# Patient Record
Sex: FEMALE | Race: WHITE | NOT HISPANIC OR LATINO | Employment: FULL TIME | URBAN - METROPOLITAN AREA
[De-identification: names, ages, dates, MRNs, and addresses within clinical notes are randomized per-mention and may not be internally consistent; named-entity substitution may affect disease eponyms.]

---

## 2017-02-24 ENCOUNTER — TRANSCRIBE ORDERS (OUTPATIENT)
Dept: LAB | Facility: CLINIC | Age: 42
End: 2017-02-24

## 2017-02-24 ENCOUNTER — APPOINTMENT (OUTPATIENT)
Dept: LAB | Facility: CLINIC | Age: 42
End: 2017-02-24
Payer: COMMERCIAL

## 2017-02-24 DIAGNOSIS — I10 ESSENTIAL (PRIMARY) HYPERTENSION: ICD-10-CM

## 2017-02-24 DIAGNOSIS — R79.89 OTHER SPECIFIED ABNORMAL FINDINGS OF BLOOD CHEMISTRY: ICD-10-CM

## 2017-02-24 LAB — TSH SERPL DL<=0.05 MIU/L-ACNC: 3.29 UIU/ML (ref 0.36–3.74)

## 2017-02-24 PROCEDURE — 86800 THYROGLOBULIN ANTIBODY: CPT

## 2017-02-24 PROCEDURE — 84443 ASSAY THYROID STIM HORMONE: CPT

## 2017-02-24 PROCEDURE — 36415 COLL VENOUS BLD VENIPUNCTURE: CPT

## 2017-02-24 PROCEDURE — 86376 MICROSOMAL ANTIBODY EACH: CPT

## 2017-02-25 LAB
THYROGLOB AB SERPL-ACNC: <1 IU/ML (ref 0–0.9)
THYROPEROXIDASE AB SERPL-ACNC: 9 IU/ML (ref 0–34)

## 2017-02-27 ENCOUNTER — GENERIC CONVERSION - ENCOUNTER (OUTPATIENT)
Dept: OTHER | Facility: OTHER | Age: 42
End: 2017-02-27

## 2017-04-11 ENCOUNTER — GENERIC CONVERSION - ENCOUNTER (OUTPATIENT)
Dept: OTHER | Facility: OTHER | Age: 42
End: 2017-04-11

## 2017-04-17 ENCOUNTER — APPOINTMENT (OUTPATIENT)
Dept: OCCUPATIONAL THERAPY | Facility: CLINIC | Age: 42
End: 2017-04-17
Payer: COMMERCIAL

## 2017-04-17 PROCEDURE — 97166 OT EVAL MOD COMPLEX 45 MIN: CPT

## 2017-04-17 PROCEDURE — 97535 SELF CARE MNGMENT TRAINING: CPT

## 2017-04-21 ENCOUNTER — APPOINTMENT (OUTPATIENT)
Dept: OCCUPATIONAL THERAPY | Facility: CLINIC | Age: 42
End: 2017-04-21
Payer: COMMERCIAL

## 2017-04-21 PROCEDURE — 97035 APP MDLTY 1+ULTRASOUND EA 15: CPT

## 2017-04-21 PROCEDURE — 97140 MANUAL THERAPY 1/> REGIONS: CPT

## 2017-04-21 PROCEDURE — 97110 THERAPEUTIC EXERCISES: CPT

## 2017-04-28 ENCOUNTER — APPOINTMENT (OUTPATIENT)
Dept: OCCUPATIONAL THERAPY | Facility: CLINIC | Age: 42
End: 2017-04-28
Payer: COMMERCIAL

## 2017-04-28 PROCEDURE — 97140 MANUAL THERAPY 1/> REGIONS: CPT

## 2017-04-28 PROCEDURE — 97035 APP MDLTY 1+ULTRASOUND EA 15: CPT

## 2017-04-28 PROCEDURE — 97110 THERAPEUTIC EXERCISES: CPT

## 2017-05-05 ENCOUNTER — GENERIC CONVERSION - ENCOUNTER (OUTPATIENT)
Dept: OTHER | Facility: OTHER | Age: 42
End: 2017-05-05

## 2017-05-05 ENCOUNTER — APPOINTMENT (OUTPATIENT)
Dept: OCCUPATIONAL THERAPY | Facility: CLINIC | Age: 42
End: 2017-05-05
Payer: COMMERCIAL

## 2017-05-05 PROCEDURE — 97140 MANUAL THERAPY 1/> REGIONS: CPT

## 2017-05-05 PROCEDURE — 97110 THERAPEUTIC EXERCISES: CPT

## 2017-05-05 PROCEDURE — 97035 APP MDLTY 1+ULTRASOUND EA 15: CPT

## 2017-05-10 ENCOUNTER — APPOINTMENT (OUTPATIENT)
Dept: OCCUPATIONAL THERAPY | Facility: CLINIC | Age: 42
End: 2017-05-10
Payer: COMMERCIAL

## 2017-05-19 ENCOUNTER — ALLSCRIPTS OFFICE VISIT (OUTPATIENT)
Dept: OTHER | Facility: OTHER | Age: 42
End: 2017-05-19

## 2017-05-19 DIAGNOSIS — J30.9 ALLERGIC RHINITIS: ICD-10-CM

## 2017-05-19 DIAGNOSIS — I10 ESSENTIAL (PRIMARY) HYPERTENSION: ICD-10-CM

## 2017-05-28 ENCOUNTER — HOSPITAL ENCOUNTER (OUTPATIENT)
Dept: RADIOLOGY | Facility: CLINIC | Age: 42
Discharge: HOME | End: 2017-05-28
Attending: EMERGENCY MEDICINE
Payer: OTHER MISCELLANEOUS

## 2017-05-28 ENCOUNTER — OFFICE VISIT (OUTPATIENT)
Dept: URGENT CARE | Facility: CLINIC | Age: 42
End: 2017-05-28
Payer: OTHER MISCELLANEOUS

## 2017-05-28 DIAGNOSIS — T14.90XA INJURY: ICD-10-CM

## 2017-05-28 PROCEDURE — G0382 LEV 3 HOSP TYPE B ED VISIT: HCPCS

## 2017-05-28 PROCEDURE — 99283 EMERGENCY DEPT VISIT LOW MDM: CPT

## 2017-05-28 PROCEDURE — 72040 X-RAY EXAM NECK SPINE 2-3 VW: CPT

## 2017-05-31 ENCOUNTER — OFFICE VISIT (OUTPATIENT)
Dept: URGENT CARE | Facility: CLINIC | Age: 42
End: 2017-05-31
Payer: OTHER MISCELLANEOUS

## 2017-05-31 PROCEDURE — 99213 OFFICE O/P EST LOW 20 MIN: CPT

## 2017-06-05 ENCOUNTER — OFFICE VISIT (OUTPATIENT)
Dept: URGENT CARE | Facility: CLINIC | Age: 42
End: 2017-06-05
Payer: OTHER MISCELLANEOUS

## 2017-06-05 PROCEDURE — 99213 OFFICE O/P EST LOW 20 MIN: CPT

## 2017-06-06 ENCOUNTER — GENERIC CONVERSION - ENCOUNTER (OUTPATIENT)
Dept: OTHER | Facility: OTHER | Age: 42
End: 2017-06-06

## 2017-06-09 ENCOUNTER — OFFICE VISIT (OUTPATIENT)
Dept: URGENT CARE | Facility: CLINIC | Age: 42
End: 2017-06-09
Payer: OTHER MISCELLANEOUS

## 2017-06-09 PROCEDURE — 99213 OFFICE O/P EST LOW 20 MIN: CPT

## 2017-06-16 ENCOUNTER — OFFICE VISIT (OUTPATIENT)
Dept: URGENT CARE | Facility: CLINIC | Age: 42
End: 2017-06-16
Payer: OTHER MISCELLANEOUS

## 2017-06-16 PROCEDURE — 99213 OFFICE O/P EST LOW 20 MIN: CPT

## 2017-09-26 ENCOUNTER — GENERIC CONVERSION - ENCOUNTER (OUTPATIENT)
Dept: OTHER | Facility: OTHER | Age: 42
End: 2017-09-26

## 2017-10-31 ENCOUNTER — GENERIC CONVERSION - ENCOUNTER (OUTPATIENT)
Dept: OTHER | Facility: OTHER | Age: 42
End: 2017-10-31

## 2017-10-31 DIAGNOSIS — I10 ESSENTIAL (PRIMARY) HYPERTENSION: ICD-10-CM

## 2017-10-31 DIAGNOSIS — Z12.31 ENCOUNTER FOR SCREENING MAMMOGRAM FOR MALIGNANT NEOPLASM OF BREAST: ICD-10-CM

## 2017-10-31 DIAGNOSIS — R94.6 ABNORMAL RESULTS OF THYROID FUNCTION STUDIES: ICD-10-CM

## 2017-10-31 DIAGNOSIS — E78.00 PURE HYPERCHOLESTEROLEMIA: ICD-10-CM

## 2017-10-31 DIAGNOSIS — K21.9 GASTRO-ESOPHAGEAL REFLUX DISEASE WITHOUT ESOPHAGITIS: ICD-10-CM

## 2017-10-31 DIAGNOSIS — M79.7 FIBROMYALGIA: ICD-10-CM

## 2017-11-10 ENCOUNTER — GENERIC CONVERSION - ENCOUNTER (OUTPATIENT)
Dept: OTHER | Facility: OTHER | Age: 42
End: 2017-11-10

## 2017-11-14 ENCOUNTER — GENERIC CONVERSION - ENCOUNTER (OUTPATIENT)
Dept: OTHER | Facility: OTHER | Age: 42
End: 2017-11-14

## 2017-11-17 ENCOUNTER — ALLSCRIPTS OFFICE VISIT (OUTPATIENT)
Dept: OTHER | Facility: OTHER | Age: 42
End: 2017-11-17

## 2017-12-01 ENCOUNTER — TRANSCRIBE ORDERS (OUTPATIENT)
Dept: URGENT CARE | Facility: CLINIC | Age: 42
End: 2017-12-01

## 2017-12-01 ENCOUNTER — APPOINTMENT (OUTPATIENT)
Dept: LAB | Facility: CLINIC | Age: 42
End: 2017-12-01
Payer: COMMERCIAL

## 2017-12-01 DIAGNOSIS — R94.6 ABNORMAL RESULTS OF THYROID FUNCTION STUDIES: ICD-10-CM

## 2017-12-01 DIAGNOSIS — M79.7 FIBROMYALGIA: ICD-10-CM

## 2017-12-01 DIAGNOSIS — E78.00 PURE HYPERCHOLESTEROLEMIA: ICD-10-CM

## 2017-12-01 DIAGNOSIS — I10 ESSENTIAL (PRIMARY) HYPERTENSION: ICD-10-CM

## 2017-12-01 LAB
ALBUMIN SERPL BCP-MCNC: 3.9 G/DL (ref 3.5–5)
ALP SERPL-CCNC: 80 U/L (ref 46–116)
ALT SERPL W P-5'-P-CCNC: 34 U/L (ref 12–78)
ANION GAP SERPL CALCULATED.3IONS-SCNC: 6 MMOL/L (ref 4–13)
AST SERPL W P-5'-P-CCNC: 12 U/L (ref 5–45)
BASOPHILS # BLD AUTO: 0.02 THOUSANDS/ΜL (ref 0–0.1)
BASOPHILS NFR BLD AUTO: 0 % (ref 0–1)
BILIRUB SERPL-MCNC: 1.08 MG/DL (ref 0.2–1)
BUN SERPL-MCNC: 15 MG/DL (ref 5–25)
CALCIUM SERPL-MCNC: 8.9 MG/DL (ref 8.3–10.1)
CHLORIDE SERPL-SCNC: 102 MMOL/L (ref 100–108)
CHOLEST SERPL-MCNC: 205 MG/DL (ref 50–200)
CO2 SERPL-SCNC: 26 MMOL/L (ref 21–32)
CREAT SERPL-MCNC: 0.84 MG/DL (ref 0.6–1.3)
EOSINOPHIL # BLD AUTO: 0.11 THOUSAND/ΜL (ref 0–0.61)
EOSINOPHIL NFR BLD AUTO: 2 % (ref 0–6)
ERYTHROCYTE [DISTWIDTH] IN BLOOD BY AUTOMATED COUNT: 13.7 % (ref 11.6–15.1)
GFR SERPL CREATININE-BSD FRML MDRD: 85 ML/MIN/1.73SQ M
GLUCOSE P FAST SERPL-MCNC: 116 MG/DL (ref 65–99)
HCT VFR BLD AUTO: 41.5 % (ref 34.8–46.1)
HDLC SERPL-MCNC: 35 MG/DL (ref 40–60)
HGB BLD-MCNC: 13.4 G/DL (ref 11.5–15.4)
LDLC SERPL CALC-MCNC: 139 MG/DL (ref 0–100)
LYMPHOCYTES # BLD AUTO: 2.25 THOUSANDS/ΜL (ref 0.6–4.47)
LYMPHOCYTES NFR BLD AUTO: 30 % (ref 14–44)
MCH RBC QN AUTO: 29.1 PG (ref 26.8–34.3)
MCHC RBC AUTO-ENTMCNC: 32.3 G/DL (ref 31.4–37.4)
MCV RBC AUTO: 90 FL (ref 82–98)
MONOCYTES # BLD AUTO: 0.49 THOUSAND/ΜL (ref 0.17–1.22)
MONOCYTES NFR BLD AUTO: 7 % (ref 4–12)
NEUTROPHILS # BLD AUTO: 4.61 THOUSANDS/ΜL (ref 1.85–7.62)
NEUTS SEG NFR BLD AUTO: 61 % (ref 43–75)
NRBC BLD AUTO-RTO: 0 /100 WBCS
PLATELET # BLD AUTO: 268 THOUSANDS/UL (ref 149–390)
PMV BLD AUTO: 11.3 FL (ref 8.9–12.7)
POTASSIUM SERPL-SCNC: 4.1 MMOL/L (ref 3.5–5.3)
PROT SERPL-MCNC: 7.7 G/DL (ref 6.4–8.2)
RBC # BLD AUTO: 4.61 MILLION/UL (ref 3.81–5.12)
SODIUM SERPL-SCNC: 134 MMOL/L (ref 136–145)
TRIGL SERPL-MCNC: 153 MG/DL
TSH SERPL DL<=0.05 MIU/L-ACNC: 2.75 UIU/ML (ref 0.36–3.74)
WBC # BLD AUTO: 7.49 THOUSAND/UL (ref 4.31–10.16)

## 2017-12-01 PROCEDURE — 85025 COMPLETE CBC W/AUTO DIFF WBC: CPT

## 2017-12-01 PROCEDURE — 80053 COMPREHEN METABOLIC PANEL: CPT

## 2017-12-01 PROCEDURE — 80061 LIPID PANEL: CPT

## 2017-12-01 PROCEDURE — 36415 COLL VENOUS BLD VENIPUNCTURE: CPT

## 2017-12-01 PROCEDURE — 84443 ASSAY THYROID STIM HORMONE: CPT

## 2017-12-02 ENCOUNTER — GENERIC CONVERSION - ENCOUNTER (OUTPATIENT)
Dept: OTHER | Facility: OTHER | Age: 42
End: 2017-12-02

## 2018-01-10 NOTE — MISCELLANEOUS
Provider Comments  Provider Comments:   Patient no-showed for today's appointment  Juan Carlos SANTORO mailed no-show letter        Signatures   Electronically signed by : Deborah Zapata MD; May 15 2016 12:02PM EST                       (Author)

## 2018-01-12 VITALS
DIASTOLIC BLOOD PRESSURE: 88 MMHG | RESPIRATION RATE: 20 BRPM | SYSTOLIC BLOOD PRESSURE: 132 MMHG | BODY MASS INDEX: 38.67 KG/M2 | TEMPERATURE: 98.1 F | HEIGHT: 63 IN | HEART RATE: 92 BPM | WEIGHT: 218.25 LBS

## 2018-01-12 NOTE — RESULT NOTES
Verified Results  (1) TSH WITH FT4 REFLEX 24Feb2017 09:13AM Marlena Avelar Order Number: [id number]     Test Name Result Flag Reference   TSH 3 290 uIU/mL  0 358-3 740   Patients undergoing fluorescein dye angiography may retain small amounts of fluorescein in the body for 48-72 hours post procedure  Samples containing fluorescein can produce falsely depressed TSH values  If the patient had this procedure,a specimen should be resubmitted post fluorescein clearance            The recommended reference ranges for TSH during pregnancy are as follows:  First trimester 0 1 to 2 5 uIU/mL  Second trimester  0 2 to 3 0 uIU/mL  Third trimester 0 3 to 3 0 uIU/m     (1) THYROID ANTIBODIES PANEL 24Feb2017 09:13AM Marlena Avelar Order Number: [id number]     Test Name Result Flag Reference   THYROGLOB AB <1 0 IU/mL  0 0 - 0 9   Thyroglobulin Antibody measured by [facility] Methodology    Performed at:  [facility] [address]  [id number]  : Yoandy Macdonald MD, Phone:  [phone]   [facility] AB 9 IU/mL  0 - 34   Performed at:  [facility] [address]  [id number]  : Yoandy Macdonald MD, Phone:  [phone]

## 2018-01-14 VITALS
WEIGHT: 221.38 LBS | RESPIRATION RATE: 18 BRPM | SYSTOLIC BLOOD PRESSURE: 110 MMHG | DIASTOLIC BLOOD PRESSURE: 72 MMHG | HEIGHT: 63 IN | HEART RATE: 72 BPM | TEMPERATURE: 98.5 F | BODY MASS INDEX: 39.23 KG/M2

## 2018-01-14 NOTE — RESULT NOTES
Verified Results  (1) THIN PREP PAP WITH IMAGING [id number] 12:00AM Jacquelin Burgess     Test Name Result Flag Reference   LAB AP CASE REPORT (Report)     Gynecologic Cytology Report            Case: [id number]                  Authorizing Provider: Jm Mcdonald PA-C   Collected:      12/01/2016           First Screen:     MORRIS Askew    Received:      12/05/2016 1604        Specimen:  LIQUID-BASED PAP, SCREENING, Cervix   LAB AP GYN PRIMARY INTERPRETATION      Negative for intraepithelial lesion or malignancy  Electronically signed by MORRIS Askew on 12/6/2016 at 4:47 PM   LAB AP GYN SPECIMEN ADEQUACY      Satisfactory for evaluation  Absence of endocervical/transformation zone component  LAB AP GYN ADDITIONAL INFORMATION (Report)     [employer]s FDA approved ,  and ThinPrep Imaging System are   utilized with strict adherence to the 's instruction manual to   prepare gynecologic and non-gynecologic cytology specimens for the   production of ThinPrep slides as well as for gynecologic ThinPrep imaging  These processes have been validated by our laboratory and/or by the     The Pap test is not a diagnostic procedure and should not be used as the   sole means to detect cervical cancer  It is only a screening procedure to   aid in the detection of cervical cancer and its precursors  Both   false-negative and false-positive results have been experienced  Your   patient's test result should be interpreted in this context together with   the history and clinical findings

## 2018-01-17 NOTE — RESULT NOTES
Verified Results  (1) CBC/PLT/DIFF [id number] 08:39AM Cassy Encinas Order Number: [id number]     Test Name Result Flag Reference   WBC COUNT 6 57 Thousand/uL  4 31-10 16   RBC COUNT 4 49 Million/uL  3 81-5 12   HEMOGLOBIN 13 1 g/dL  11 5-15 4   HEMATOCRIT 40 8 %  34 8-46  1   MCV 91 fL  82-98   MCH 29 2 pg  26 8-34 3   MCHC 32 1 g/dL  31 4-37 4   RDW 13 4 %  11 6-15 1   MPV 11 2 fL  8 9-12 7   PLATELET COUNT 378 Thousands/uL  149-390   nRBC AUTOMATED 0 /100 WBCs     NEUTROPHILS RELATIVE PERCENT 59 %  43-75   LYMPHOCYTES RELATIVE PERCENT 33 %  14-44   MONOCYTES RELATIVE PERCENT 6 %  4-12   EOSINOPHILS RELATIVE PERCENT 2 %  0-6   BASOPHILS RELATIVE PERCENT 0 %  0-1   NEUTROPHILS ABSOLUTE COUNT 3 86 Thousands/?L  1 85-7 62   LYMPHOCYTES ABSOLUTE COUNT 2 15 Thousands/?L  0 60-4 47   MONOCYTES ABSOLUTE COUNT 0 42 Thousand/?L  0 17-1 22   EOSINOPHILS ABSOLUTE COUNT 0 10 Thousand/?L  0 00-0 61   BASOPHILS ABSOLUTE COUNT 0 02 Thousands/?L  0 00-0 10   - Patient Instructions: This bloodwork is non-fasting  Please drink two glasses of water morning of bloodwork  - Patient Instructions: This bloodwork is non-fasting  Please drink two glasses of water morning of bloodwork  (1) COMPREHENSIVE METABOLIC PANEL [id number] 52:27NC Cassy Encinas Order Number: [id number]     Test Name Result Flag Reference   GLUCOSE,RANDM 108 mg/dL     If the patient is fasting, the ADA then defines impaired fasting glucose as > 100 mg/dL and diabetes as > or equal to 123 mg/dL     SODIUM 138 mmol/L  136-145   POTASSIUM 3 9 mmol/L  3 5-5 3   CHLORIDE 104 mmol/L  100-108   CARBON DIOXIDE 27 mmol/L  21-32   ANION GAP (CALC) 7 mmol/L  4-13   BLOOD UREA NITROGEN 11 mg/dL  5-25   CREATININE 0 81 mg/dL  0 60-1 30   Standardized to IDMS reference method   CALCIUM 8 8 mg/dL  8 3-10 1   BILI, TOTAL 0 53 mg/dL  0 20-1 00   ALK PHOSPHATAS 102 U/L     ALT (SGPT) 35 U/L  12-78   AST(SGOT) 17 U/L  5-45   ALBUMIN 3 8 g/dL  3 5-5 0   TOTAL PROTEIN 7 4 g/dL  6 4-8 2   eGFR Non-African American      >60 0 ml/min/1 73sq m   - Patient Instructions: This is a fasting blood test  Water, black tea or black coffee only after 9:00pm the night before test Drink 2 glasses of water the morning of test   National Kidney Disease Education Program recommendations are as follows:  GFR calculation is accurate only with a steady state creatinine  Chronic Kidney disease less than 60 ml/min/1 73 sq  meters  Kidney failure less than 15 ml/min/1 73 sq  meters  (1) LIPID PANEL FASTING W DIRECT LDL REFLEX [id number] 08:39AM Sakshi Casanova Order Number: [id number]     Test Name Result Flag Reference   CHOLESTEROL 180 mg/dL     LDL CHOLESTEROL CALCULATED 110 mg/dL H 0-100   - Patient Instructions: This is a fasting blood test  Water, black tea or black coffee only after 9:00pm the night before test   Drink 2 glasses of water the morning of test     - Patient Instructions: This is a fasting blood test  Water, black tea or black coffee only after 9:00pm the night before test Drink 2 glasses of water the morning of test   Triglyceride:         Normal              <150 mg/dl       Borderline High    150-199 mg/dl       High               200-499 mg/dl       Very High          >499 mg/dl  Cholesterol:         Desirable        <200 mg/dl      Borderline High  200-239 mg/dl      High             >239 mg/dl  HDL Cholesterol:        High    >59 mg/dL      Low     <41 mg/dL  LDL Cholesterol:        Optimal          <100 mg/dl        Near Optimal     100-129 mg/dl        Above Optimal          Borderline High   130-159 mg/dl          High              160-189 mg/dl          Very High        >189 mg/dl  LDL CALCULATED:    This screening LDL is a calculated result  It does not have the accuracy of the Direct Measured LDL in the monitoring of patients with hyperlipidemia and/or statin therapy     Direct Measure LDL (CDK171) must be ordered separately in these patients  TRIGLYCERIDES 166 mg/dL H <=150   Specimen collection should occur prior to N-Acetylcysteine or Metamizole administration due to the potential for falsely depressed results  HDL,DIRECT 37 mg/dL L 40-60   Specimen collection should occur prior to Metamizole administration due to the potential for falsely depressed results  (1) VITAMIN D 25-HYDROXY [id number] 08:39AM Celine Tavares Order Number: [id number]     Test Name Result Flag Reference   VIT D 25-HYDROX 24 5 ng/mL L 30 0-100 0   This assay is a certified procedure of the CDC Vitamin D Standardization Certification Program (VDSCP)     Deficiency <20ng/ml   Insufficiency 20-30ng/ml   Sufficient  ng/ml     *Patients undergoing fluorescein dye angiography may retain small amounts of fluorescein in the body for 48-72 hours post procedure  Samples containing fluorescein can produce falsely elevated Vitamin D values  If the patient had this procedure, a specimen should be resubmitted post fluorescein clearance  (1) TSH WITH FT4 REFLEX [id number] 08:39AM Celine Tavares Order Number: [id number]     Test Name Result Flag Reference   TSH 4 750 uIU/mL H 0 358-3 740   - Patient Instructions: This is a fasting blood test  Water, black tea or black coffee only after 9:00pm the night before test Drink 2 glasses of water the morning of test   Patients undergoing fluorescein dye angiography may retain small amounts of fluorescein in the body for 48-72 hours post procedure  Samples containing fluorescein can produce falsely depressed TSH values  If the patient had this procedure,a specimen should be resubmitted post fluorescein clearance  The recommended reference ranges for TSH during pregnancy are as follows:  First trimester 0 1 to 2 5 uIU/mL  Second trimester  0 2 to 3 0 uIU/mL  Third trimester 0 3 to 3 0 uIU/m   T4,FREE 1 16 ng/dL  0 76-1 46   - Patient Instructions:  This is a fasting blood test  Water, black tea or black coffee only after 9:00pm the night before test Drink 2 glasses of water the morning of test

## 2018-01-22 VITALS
TEMPERATURE: 98.9 F | HEIGHT: 63 IN | RESPIRATION RATE: 16 BRPM | SYSTOLIC BLOOD PRESSURE: 110 MMHG | WEIGHT: 223.38 LBS | BODY MASS INDEX: 39.58 KG/M2 | HEART RATE: 76 BPM | DIASTOLIC BLOOD PRESSURE: 76 MMHG

## 2018-01-22 VITALS
BODY MASS INDEX: 39.34 KG/M2 | HEIGHT: 63 IN | SYSTOLIC BLOOD PRESSURE: 122 MMHG | DIASTOLIC BLOOD PRESSURE: 88 MMHG | HEART RATE: 119 BPM | TEMPERATURE: 97.9 F | OXYGEN SATURATION: 97 % | WEIGHT: 222 LBS | RESPIRATION RATE: 18 BRPM

## 2018-01-22 VITALS
SYSTOLIC BLOOD PRESSURE: 122 MMHG | HEIGHT: 63 IN | HEART RATE: 76 BPM | WEIGHT: 222 LBS | DIASTOLIC BLOOD PRESSURE: 94 MMHG | BODY MASS INDEX: 39.34 KG/M2 | TEMPERATURE: 97.3 F | RESPIRATION RATE: 20 BRPM

## 2018-01-23 NOTE — RESULT NOTES
Verified Results  (1) COMPREHENSIVE METABOLIC PANEL [id number] 80:99CB Iwona Ba Order Number: [id number]     Test Name Result Flag Reference   SODIUM 134 mmol/L L 136-145   POTASSIUM 4 1 mmol/L  3 5-5 3   CHLORIDE 102 mmol/L  100-108   CARBON DIOXIDE 26 mmol/L  21-32   ANION GAP (CALC) 6 mmol/L  4-13   BLOOD UREA NITROGEN 15 mg/dL  5-25   CREATININE 0 84 mg/dL  0 60-1 30   Standardized to IDMS reference method   CALCIUM 8 9 mg/dL  8 3-10 1   BILI, TOTAL 1 08 mg/dL H 0 20-1 00   ALK PHOSPHATAS 80 U/L     ALT (SGPT) 34 U/L  12-78   Specimen collection should occur prior to Sulfasalazine and/or Sulfapyridine administration due to the potential for falsely depressed results  AST(SGOT) 12 U/L  5-45   Specimen collection should occur prior to Sulfasalazine administration due to the potential for falsely depressed results  ALBUMIN 3 9 g/dL  3 5-5 0   TOTAL PROTEIN 7 7 g/dL  6 4-8 2   eGFR 85 ml/min/1 73sq m     National Kidney Disease Education Program recommendations are as follows:  GFR calculation is accurate only with a steady state creatinine  Chronic Kidney disease less than 60 ml/min/1 73 sq  meters  Kidney failure less than 15 ml/min/1 73 sq  meters  GLUCOSE FASTING 116 mg/dL H 65-99   Specimen collection should occur prior to Sulfasalazine administration due to the potential for falsely depressed results  Specimen collection should occur prior to Sulfapyridine administration due to the potential for falsely elevated results

## 2018-02-08 ENCOUNTER — TELEPHONE (OUTPATIENT)
Dept: INTERNAL MEDICINE CLINIC | Facility: CLINIC | Age: 43
End: 2018-02-08

## 2018-02-08 DIAGNOSIS — K26.3 ACUTE DUODENAL ULCER: Primary | ICD-10-CM

## 2018-02-08 NOTE — TELEPHONE ENCOUNTER
States pt needs a doctor referral for gastro visit at [facility]  Could you please put the order in for duodenal ulcer? They will pull it out of epic   Thanks

## 2018-02-09 ENCOUNTER — OFFICE VISIT (OUTPATIENT)
Dept: URGENT CARE | Facility: CLINIC | Age: 43
End: 2018-02-09
Payer: COMMERCIAL

## 2018-02-09 VITALS
HEIGHT: 63 IN | SYSTOLIC BLOOD PRESSURE: 118 MMHG | WEIGHT: 220 LBS | TEMPERATURE: 98.7 F | HEART RATE: 72 BPM | BODY MASS INDEX: 38.98 KG/M2 | OXYGEN SATURATION: 98 % | RESPIRATION RATE: 18 BRPM | DIASTOLIC BLOOD PRESSURE: 78 MMHG

## 2018-02-09 DIAGNOSIS — J01.91 ACUTE RECURRENT SINUSITIS, UNSPECIFIED LOCATION: Primary | ICD-10-CM

## 2018-02-09 PROCEDURE — 99213 OFFICE O/P EST LOW 20 MIN: CPT | Performed by: PHYSICIAN ASSISTANT

## 2018-02-09 RX ORDER — ASPIRIN 81 MG/1
81 TABLET ORAL DAILY
COMMUNITY

## 2018-02-09 RX ORDER — ATORVASTATIN CALCIUM 40 MG/1
1 TABLET, FILM COATED ORAL DAILY
COMMUNITY
Start: 2013-09-05 | End: 2018-08-29 | Stop reason: SDUPTHER

## 2018-02-09 RX ORDER — CEFPROZIL 250 MG/1
250 TABLET, FILM COATED ORAL 2 TIMES DAILY
Qty: 20 TABLET | Refills: 0 | Status: SHIPPED | OUTPATIENT
Start: 2018-02-09 | End: 2018-02-19

## 2018-02-09 RX ORDER — SERTRALINE HYDROCHLORIDE 25 MG/1
1 TABLET, FILM COATED ORAL DAILY
COMMUNITY
Start: 2017-10-31 | End: 2018-04-17 | Stop reason: ALTCHOICE

## 2018-02-09 RX ORDER — LIDOCAINE 50 MG/G
OINTMENT TOPICAL
COMMUNITY
Start: 2018-01-12 | End: 2018-09-27 | Stop reason: ALTCHOICE

## 2018-02-09 RX ORDER — PROPRANOLOL HYDROCHLORIDE 160 MG/1
160 CAPSULE, EXTENDED RELEASE ORAL DAILY
COMMUNITY

## 2018-02-09 RX ORDER — PREDNISONE 20 MG/1
TABLET ORAL
Qty: 15 TABLET | Refills: 0 | Status: ON HOLD | OUTPATIENT
Start: 2018-02-09 | End: 2018-03-02 | Stop reason: ALTCHOICE

## 2018-02-09 RX ORDER — FLUTICASONE PROPIONATE 50 MCG
2 SPRAY, SUSPENSION (ML) NASAL DAILY
COMMUNITY
Start: 2014-09-15 | End: 2018-11-13 | Stop reason: SDUPTHER

## 2018-02-09 RX ORDER — PANTOPRAZOLE SODIUM 40 MG/1
1 TABLET, DELAYED RELEASE ORAL 2 TIMES DAILY
COMMUNITY
Start: 2017-09-26 | End: 2018-09-27 | Stop reason: ALTCHOICE

## 2018-02-09 RX ORDER — LISINOPRIL AND HYDROCHLOROTHIAZIDE 25; 20 MG/1; MG/1
1 TABLET ORAL DAILY
COMMUNITY
Start: 2017-12-25 | End: 2018-09-21 | Stop reason: SDUPTHER

## 2018-02-09 NOTE — PATIENT INSTRUCTIONS
Rhinosinusitis   WHAT YOU NEED TO KNOW:   Rhinosinusitis (RS) is inflammation of your nose and sinuses  It commonly begins as a virus, often as a common cold  Viruses usually last 7 to 10 days and do not need treatment  When the virus does not get better on its own, you may have bacterial RS  This means that bacteria have begun to grow inside your sinuses  Acute RS lasts less than 4 weeks  Chronic RS lasts 12 weeks or more  Recurrent RS is when you have 4 or more episodes of RS in one year  DISCHARGE INSTRUCTIONS:   Return to the emergency department if:   · Your eye and eyelid are red, swollen, and painful  · You cannot open your eye  · You have double vision or you cannot see  · Your eyeball bulges out or you cannot move your eye  · You are more sleepy than normal or you notice changes in your ability to think, move, or talk  · You have a stiff neck, a fever, or a bad headache  · You have swelling of your forehead or scalp  Contact your healthcare provider if:   · Your symptoms are worse or do not improve after 3 to 5 days of treatment  · You have questions or concerns about your condition or care  Medicines: You may need any of the following:  · Acetaminophen  decreases pain and fever  It is available without a doctor's order  Ask how much to take and how often to take it  Follow directions  Acetaminophen can cause liver damage if not taken correctly  · NSAIDs , such as ibuprofen, help decrease swelling, pain, and fever  This medicine is available with or without a doctor's order  NSAIDs can cause stomach bleeding or kidney problems in certain people  If you take blood thinner medicine, always ask your healthcare provider if NSAIDs are safe for you  Always read the medicine label and follow directions  · Nasal steroid sprays  decrease inflammation in your nose and sinuses  · Decongestants  reduce swelling and drain mucus in the nose and sinuses   They may help you breathe easier  · Antihistamines  dry mucus in the nose and relieve sneezing  · Antibiotics  treat a bacterial infection and may be needed if your symptoms do not improve or they get worse  · Take your medicine as directed  Contact your healthcare provider if you think your medicine is not helping or if you have side effects  Tell him or her if you are allergic to any medicine  Keep a list of the medicines, vitamins, and herbs you take  Include the amounts, and when and why you take them  Bring the list or the pill bottles to follow-up visits  Carry your medicine list with you in case of an emergency  Self-care:   · Rinse your sinuses  Use a sinus rinse device to rinse your nasal passages with a saline (salt water) solution  This will help thin the mucus in your nose and rinse away pollen and dirt  It will also help reduce swelling so you can breathe normally  Ask your healthcare provider how often to do this  · Breathe in steam   Heat a bowl of water until you see steam  Lean over the bowl and make a tent over your head with a large towel  Breathe deeply for about 20 minutes  Be careful not to get too close to the steam or burn yourself  Do this 3 times a day  You can also breathe deeply when you take a hot shower  · Sleep with your head elevated  Place an extra pillow under your head before you go to sleep to help your sinuses drain  · Drink liquids as directed  Ask your healthcare provider how much liquid to drink each day and which liquids are best for you  Liquids will thin the mucus in your nose and help it drain  Avoid drinks that contain alcohol or caffeine  · Do not smoke, and avoid secondhand smoke  Nicotine and other chemicals in cigarettes and cigars can make your symptoms worse  Ask your healthcare provider for information if you currently smoke and need help to quit  E-cigarettes or smokeless tobacco still contain nicotine   Talk to your healthcare provider before you use these products  Follow up with your healthcare provider as directed: Follow up if your symptoms are worse or not better after 3 to 5 days of treatment  Write down your questions so you remember to ask them during your visits  © 2017 [employer] Information is for End User's use only and may not be sold, redistributed or otherwise used for commercial purposes  All illustrations and images included in CareNotes® are the copyrighted property of [employer] M [employer]  or [employer] Dur[employer]  The above information is an  only  It is not intended as medical advice for individual conditions or treatments  Talk to your doctor, nurse or pharmacist before following any medical regimen to see if it is safe and effective for you

## 2018-02-13 ENCOUNTER — OFFICE VISIT (OUTPATIENT)
Dept: GASTROENTEROLOGY | Facility: HOSPITAL | Age: 43
End: 2018-02-13
Payer: COMMERCIAL

## 2018-02-13 VITALS
TEMPERATURE: 97.7 F | SYSTOLIC BLOOD PRESSURE: 152 MMHG | HEART RATE: 64 BPM | HEIGHT: 63 IN | WEIGHT: 215.8 LBS | DIASTOLIC BLOOD PRESSURE: 89 MMHG | BODY MASS INDEX: 38.24 KG/M2

## 2018-02-13 DIAGNOSIS — R10.13 DYSPEPSIA: ICD-10-CM

## 2018-02-13 DIAGNOSIS — K21.9 CHRONIC GERD: Primary | ICD-10-CM

## 2018-02-13 DIAGNOSIS — R13.19 ESOPHAGEAL DYSPHAGIA: ICD-10-CM

## 2018-02-13 PROBLEM — R13.10 DYSPHAGIA: Status: ACTIVE | Noted: 2018-02-13

## 2018-02-13 PROCEDURE — 99244 OFF/OP CNSLTJ NEW/EST MOD 40: CPT | Performed by: INTERNAL MEDICINE

## 2018-02-13 NOTE — PATIENT INSTRUCTIONS
Gastroesophageal Reflux Disease   WHAT YOU NEED TO KNOW:   Gastroesophageal reflux occurs when acid and food in the stomach back up into the esophagus  Gastroesophageal reflux disease (GERD) is reflux that occurs more than twice a week for a few weeks  It usually causes heartburn and other symptoms  GERD can cause other health problems over time if it is not treated  DISCHARGE INSTRUCTIONS:   Seek care immediately if:   · You feel full and cannot burp or vomit  · You have severe chest pain and sudden trouble breathing  · Your bowel movements are black, bloody, or tarry-looking  · Your vomit looks like coffee grounds or has blood in it  Contact your healthcare provider if:   · You vomit large amounts, or you vomit often  · You have trouble breathing after you vomit  · You have trouble swallowing, or pain with swallowing  · You are losing weight without trying  · Your symptoms get worse or do not improve with treatment  · You have questions or concerns about your condition or care  Medicines:   · Medicines  are used to decrease stomach acid  Medicine may also be used to help your lower esophageal sphincter and stomach contract (tighten) more  · Take your medicine as directed  Contact your healthcare provider if you think your medicine is not helping or if you have side effects  Tell him or her if you are allergic to any medicine  Keep a list of the medicines, vitamins, and herbs you take  Include the amounts, and when and why you take them  Bring the list or the pill bottles to follow-up visits  Carry your medicine list with you in case of an emergency  Manage GERD:   · Do not have foods or drinks that may increase heartburn  These include chocolate, peppermint, fried or fatty foods, drinks that contain caffeine, or carbonated drinks (soda)  Other foods include spicy foods, onions, tomatoes, and tomato-based foods   Do not have foods or drinks that can irritate your esophagus, such as citrus fruits, juices, and alcohol  · Do not eat large meals  When you eat a lot of food at one time, your stomach needs more acid to digest it  Eat 6 small meals each day instead of 3 large ones, and eat slowly  Do not eat meals 2 to 3 hours before bedtime  · Elevate the head of your bed  Place 6-inch blocks under the head of your bed frame  You may also use more than one pillow under your head and shoulders while you sleep  · Maintain a healthy weight  If you are overweight, weight loss may help relieve symptoms of GERD  · Do not smoke  Smoking weakens the lower esophageal sphincter and increases the risk of GERD  Ask your healthcare provider for information if you currently smoke and need help to quit  E-cigarettes or smokeless tobacco still contain nicotine  Talk to your healthcare provider before you use these products  · Do not wear clothing that is tight around your waist   Tight clothing can put pressure on your stomach and cause or worsen GERD symptoms  Follow up with your healthcare provider as directed:  Write down your questions so you remember to ask them during your visits  © 2017 [facility] [facility] Information is for End User's use only and may not be sold, redistributed or otherwise used for commercial purposes  All illustrations and images included in CareNotes® are the copyrighted property of [employer] M [employer] Inc  or [employer] Durant  The above information is an  only  It is not intended as medical advice for individual conditions or treatments  Talk to your doctor, nurse or pharmacist before following any medical regimen to see if it is safe and effective for you    High Fiber Diet   AMBULATORY CARE:   A high-fiber diet  includes foods that have a high amount of fiber  Fiber is the part of fruits, vegetables, and grains that is not broken down by your body  Fiber keeps your bowel movements regular   Fiber can also help lower your cholesterol level, control blood sugar in people with diabetes, and relieve constipation  Fiber can also help you control your weight because it helps you feel full faster  Most adults should eat 25 to 35 grams of fiber each day  Talk to your dietitian or healthcare provider about the amount of fiber you need  Good sources of fiber:   · Foods with at least 4 grams of fiber per serving:      ¨ ? to ½ cup of high-fiber cereal (check the nutrition label on the box)    ¨ ½ cup of blackberries or raspberries    ¨ 4 dried prunes    ¨ 1 cooked artichoke    ¨ ½ cup of cooked legumes, such as lentils, or red, kidney, and santana beans    · Foods with 1 to 3 grams of fiber per serving:      ¨ 1 slice of whole-wheat, pumpernickel, or rye bread    ¨ ½ cup of cooked brown rice    ¨ 4 whole-wheat crackers    ¨ 1 cup of oatmeal    ¨ ½ cup of cereal with 1 to 3 grams of fiber per serving (check the nutrition label on the box)    ¨ 1 small piece of fruit, such as an apple, banana, pear, kiwi, or orange    ¨ 3 dates    ¨ ½ cup of canned apricots, fruit cocktail, peaches, or pears    ¨ ½ cup of raw or cooked vegetables, such as carrots, cauliflower, cabbage, spinach, squash, or corn  Ways that you can increase fiber in your diet:   · Choose brown or wild rice instead of white rice  · Use whole wheat flour in recipes instead of white or all-purpose flour  · Add beans and peas to casseroles or soups  · Choose fresh fruit and vegetables with peels or skins on instead of juices  Other diet guidelines to follow:   · Add fiber to your diet slowly  You may have abdominal discomfort, bloating, and gas if you add fiber to your diet too quickly  · Drink plenty of liquids as you add fiber to your diet  You may have nausea or develop constipation if you do not drink enough water  Ask how much liquid to drink each day and which liquids are best for you    © 2017 [employer] Information is for End User's use only and may not be sold, redistributed or otherwise used for commercial purposes  All illustrations and images included in CareNotes® are the copyrighted property of [employer]  or [employer] Durant  The above information is an  only  It is not intended as medical advice for individual conditions or treatments  Talk to your doctor, nurse or pharmacist before following any medical regimen to see if it is safe and effective for you

## 2018-02-13 NOTE — PROGRESS NOTES
[facility] - Outpatient Consultation  Melissa Moser 38 y o  female MRN: [id number]  Encounter: [id number]          ASSESSMENT AND PLAN:      1  Chronic GERD -She is currently on  Pantoprazole 40 mg twice a day  She continued to have breakthrough symptoms specially at night and while lying supine  We reviewed reflux precautions  I gave her handout on dietary recommendations  I advised her to elevate head of bed by 4-6 inch at bedtime  Avoid eating within 2-3 hours of going to bed  I will schedule her for EGD to assess for esophagitis and hiatal hernia  2  Esophageal dysphagia -   She has  Mild dysphagia to solids such as bread  This is likely secondary to underlying esophagitis rule out esophageal stricture from uncontrolled acid reflux  EGD for further evaluation  3  Dyspepsia -  She reports severe epigastric pain which is concerning for underlying peptic ulcer disease from NSAIDs  I advised her to stop taking  Advil p m  Instead take Tylenol as needed for arthritis  Continue pantoprazole  We will schedule her for EGD as soon as possible to assess for peptic ulcer disease  4  Chronic idiopathic constipation  - I advised her to eat high-fiber diet  Handout provided regarding high-fiber diet  Continued to drink plenty of water  Regular exercise  Start taking Metamucil 1 scoop daily if no improvement you can take MiraLax 17 g daily  I will see her back in the office in about 1 month     ______________________________________________________________________    HPI:    69-year-old female here for management of gastroesophageal reflux disease, dysphagia, epigastric pain and chronic constipation  She had reflux symptoms for over 5 years  Her symptoms include regurgitation and epigastric burning pain  She reports worsening of her symptoms over the last several months    Currently she is on pantoprazole 40 mg daily which is helping with her heartburn and decreased frequency of regurgitation  She also reports severe epigastric pain radiating to her back  She denies nausea, vomiting, hematemesis, or melena  She takes Advil p m  For arthritis  She takes  6-8 tablets per week  He never had EGD in the past   No prior history of peptic ulcer disease  She denies weight loss  She has chronic constipation  Bowel movement 1 to 2 times a week  She reports  Having hard stool with excessive straining  She never had colonoscopy before  We reviewed her dietary habit which includes low-fiber diet  REVIEW OF SYSTEMS:    CONSTITUTIONAL: Denies any fever, chills, rigors, and weight loss  HEENT: No earache or tinnitus  Denies hearing loss or visual disturbances  CARDIOVASCULAR: No chest pain or palpitations  RESPIRATORY: Denies any cough, hemoptysis, shortness of breath or dyspnea on exertion  GASTROINTESTINAL: As noted in the History of Present Illness  GENITOURINARY: No problems with urination  Denies any hematuria or dysuria  NEUROLOGIC: No dizziness or vertigo, denies headaches  MUSCULOSKELETAL: Denies any muscle or joint pain  SKIN: Denies skin rashes or itching  ENDOCRINE: Denies excessive thirst  Denies intolerance to heat or cold  PSYCHOSOCIAL: Denies depression or anxiety  Denies any recent memory loss  Historical Information   Past Medical History:   Diagnosis Date    GERD (gastroesophageal reflux disease)     Hypertension      Past Surgical History:   Procedure Laterality Date    NECK SURGERY      TONSILLECTOMY      TUBAL LIGATION       Social History   History   Alcohol Use    Yes     Comment: socially     History   Drug Use No     History   Smoking Status    Former Smoker   Smokeless Tobacco    Never Used     History reviewed  No pertinent family history      Meds/Allergies       Current Outpatient Prescriptions:     aspirin (ECOTRIN LOW STRENGTH) 81 mg EC tablet    atorvastatin (LIPITOR) 40 mg tablet    cefprozil (CEFZIL) 250 mg tablet    fluticasone (FLONASE) 50 mcg/act nasal spray    lidocaine (XYLOCAINE) 5 % ointment    lisinopril-hydrochlorothiazide (PRINZIDE,ZESTORETIC) 20-25 MG per tablet    pantoprazole (PROTONIX) 40 mg tablet    predniSONE 20 mg tablet    propranolol (INDERAL LA) 160 mg    sertraline (ZOLOFT) 25 mg tablet    No Known Allergies        Objective     Blood pressure 152/89, pulse 64, temperature 97 7 °F (36 5 °C), temperature source Tympanic, height 5' 3" (1 6 m), weight 97 9 kg (215 lb 12 8 oz), last menstrual period 01/19/2018  PHYSICAL EXAM:      General Appearance:   Alert, cooperative, no distress   HEENT:   Normocephalic, atraumatic, anicteric      Neck:  Supple, symmetrical, trachea midline   Lungs:   Clear to auscultation bilaterally; no rales, rhonchi or wheezing; respirations unlabored    Heart[de-identified]   Regular rate and rhythm; no murmur, rub, or gallop  Abdomen:   Soft, epigastric tenderness, no guarding or rigidity, non-distended; normal bowel sounds; no masses, no organomegaly    Genitalia:   Deferred    Rectal:   Deferred    Extremities:  No cyanosis, clubbing or edema    Pulses:  2+ and symmetric    Skin:  No jaundice, rashes, or lesions    Lymph nodes:  No palpable cervical lymphadenopathy        Lab Results:   No visits with results within 1 Day(s) from this visit     Latest known visit with results is:   Appointment on 12/01/2017   Component Date Value    WBC 12/01/2017 7 49     RBC 12/01/2017 4 61     Hemoglobin 12/01/2017 13 4     Hematocrit 12/01/2017 41 5     MCV 12/01/2017 90     [address] 12/01/2017 29 1     MCHC 12/01/2017 32 3     RDW 12/01/2017 13 7     MPV 12/01/2017 11 3     Platelets 27/15/4939 268     nRBC 12/01/2017 0     Neutrophils Relative 12/01/2017 61     Lymphocytes Relative 12/01/2017 30     Monocytes Relative 12/01/2017 7     Eosinophils Relative 12/01/2017 2     Basophils Relative 12/01/2017 0     Neutrophils Absolute 12/01/2017 4 61     Lymphocytes Absolute 12/01/2017 2 25     Monocytes Absolute 12/01/2017 0 49     Eosinophils Absolute 12/01/2017 0 11     Basophils Absolute 12/01/2017 0 02     Sodium 12/01/2017 134*    Potassium 12/01/2017 4 1     Chloride 12/01/2017 102     CO2 12/01/2017 26     Anion Gap 12/01/2017 6     BUN 12/01/2017 15     Creatinine 12/01/2017 0 84     Glucose, Fasting 12/01/2017 116*    Calcium 12/01/2017 8 9     AST 12/01/2017 12     ALT 12/01/2017 34     Alkaline Phosphatase 12/01/2017 80     Total Protein 12/01/2017 7 7     Albumin 12/01/2017 3 9     Total Bilirubin 12/01/2017 1 08*    eGFR 12/01/2017 85     Cholesterol 12/01/2017 205*    Triglycerides 12/01/2017 153*    HDL, Direct 12/01/2017 35*    LDL Calculated 12/01/2017 139*    TSH 3RD GENERATON 12/01/2017 2 750

## 2018-02-28 ENCOUNTER — ANESTHESIA EVENT (OUTPATIENT)
Dept: PERIOP | Facility: HOSPITAL | Age: 43
End: 2018-02-28
Payer: COMMERCIAL

## 2018-03-02 ENCOUNTER — HOSPITAL ENCOUNTER (OUTPATIENT)
Facility: HOSPITAL | Age: 43
Setting detail: OUTPATIENT SURGERY
Discharge: HOME | End: 2018-03-02
Attending: INTERNAL MEDICINE | Admitting: INTERNAL MEDICINE
Payer: COMMERCIAL

## 2018-03-02 ENCOUNTER — ANESTHESIA (OUTPATIENT)
Dept: PERIOP | Facility: HOSPITAL | Age: 43
End: 2018-03-02
Payer: COMMERCIAL

## 2018-03-02 VITALS
HEIGHT: 63 IN | TEMPERATURE: 97.3 F | BODY MASS INDEX: 38.09 KG/M2 | OXYGEN SATURATION: 98 % | WEIGHT: 215 LBS | DIASTOLIC BLOOD PRESSURE: 78 MMHG | RESPIRATION RATE: 18 BRPM | SYSTOLIC BLOOD PRESSURE: 136 MMHG | HEART RATE: 62 BPM

## 2018-03-02 DIAGNOSIS — K21.9 GASTROESOPHAGEAL REFLUX DISEASE WITHOUT ESOPHAGITIS: ICD-10-CM

## 2018-03-02 DIAGNOSIS — R13.19 ESOPHAGEAL DYSPHAGIA: ICD-10-CM

## 2018-03-02 DIAGNOSIS — R10.13 DYSPEPSIA: Primary | ICD-10-CM

## 2018-03-02 PROCEDURE — 88342 IMHCHEM/IMCYTCHM 1ST ANTB: CPT | Performed by: PATHOLOGY

## 2018-03-02 PROCEDURE — 43239 EGD BIOPSY SINGLE/MULTIPLE: CPT | Performed by: INTERNAL MEDICINE

## 2018-03-02 PROCEDURE — 88305 TISSUE EXAM BY PATHOLOGIST: CPT | Performed by: INTERNAL MEDICINE

## 2018-03-02 PROCEDURE — 88342 IMHCHEM/IMCYTCHM 1ST ANTB: CPT | Performed by: INTERNAL MEDICINE

## 2018-03-02 PROCEDURE — 88305 TISSUE EXAM BY PATHOLOGIST: CPT | Performed by: PATHOLOGY

## 2018-03-02 RX ORDER — PROPOFOL 10 MG/ML
INJECTION, EMULSION INTRAVENOUS AS NEEDED
Status: DISCONTINUED | OUTPATIENT
Start: 2018-03-02 | End: 2018-03-02 | Stop reason: SURG

## 2018-03-02 RX ORDER — LIDOCAINE HYDROCHLORIDE 10 MG/ML
INJECTION, SOLUTION INFILTRATION; PERINEURAL AS NEEDED
Status: DISCONTINUED | OUTPATIENT
Start: 2018-03-02 | End: 2018-03-02 | Stop reason: SURG

## 2018-03-02 RX ORDER — SODIUM CHLORIDE, SODIUM LACTATE, POTASSIUM CHLORIDE, CALCIUM CHLORIDE 600; 310; 30; 20 MG/100ML; MG/100ML; MG/100ML; MG/100ML
125 INJECTION, SOLUTION INTRAVENOUS CONTINUOUS
Status: DISCONTINUED | OUTPATIENT
Start: 2018-03-02 | End: 2018-03-02 | Stop reason: HOSPADM

## 2018-03-02 RX ADMIN — PROPOFOL 50 MG: 10 INJECTION, EMULSION INTRAVENOUS at 12:56

## 2018-03-02 RX ADMIN — PROPOFOL 50 MG: 10 INJECTION, EMULSION INTRAVENOUS at 12:57

## 2018-03-02 RX ADMIN — PROPOFOL 50 MG: 10 INJECTION, EMULSION INTRAVENOUS at 12:54

## 2018-03-02 RX ADMIN — PROPOFOL 50 MG: 10 INJECTION, EMULSION INTRAVENOUS at 12:59

## 2018-03-02 RX ADMIN — SODIUM CHLORIDE, SODIUM LACTATE, POTASSIUM CHLORIDE, AND CALCIUM CHLORIDE 125 ML/HR: .6; .31; .03; .02 INJECTION, SOLUTION INTRAVENOUS at 11:13

## 2018-03-02 RX ADMIN — PROPOFOL 100 MG: 10 INJECTION, EMULSION INTRAVENOUS at 12:52

## 2018-03-02 RX ADMIN — LIDOCAINE HYDROCHLORIDE 50 MG: 10 INJECTION, SOLUTION INFILTRATION; PERINEURAL at 12:52

## 2018-03-02 NOTE — OP NOTE
**** GI/ENDOSCOPY REPORT ****     PATIENT NAME: AUSTIN Cunha - VISIT ID:     INTRODUCTION: Esophagogastroduodenoscopy - A 38 female patient presents   for an outpatient Esophagogastroduodenoscopy at [facility]  INDICATIONS: GERD  Dysphagia  Dyspepsia  CONSENT: The benefits, risks, and alternatives to the procedure were   discussed and informed consent was obtained from the patient  PREPARATION:  EKG, pulse, pulse oximetry and blood pressure were monitored   throughout the procedure  ASA Classification: Class 3 - Patient has severe   systemic disturbance that may or may not be related to the disorder   requiring surgery  MEDICATIONS: Anesthesia-check records     PROCEDURE:  The endoscope was passed without difficulty through the mouth   under direct visualization and advanced to the 2nd portion of the   duodenum  The scope was withdrawn and the mucosa was carefully examined  FINDINGS:   Esophagus: The entire esophagus appeared to be normal    A   biopsy was taken  The GE junction was observed 41 cm from the entry site  There was a 1 cm hiatus hernia visible in the esophagus  Stomach: The   stomach appeared to be normal  A moderate quantity of food was found in   the body of the stomach  Two random biopsies was taken from the body of   the stomach  Duodenum: The duodenal bulb and 2nd portion of the duodenum   appeared to be normal    Three random biopsies was taken  COMPLICATIONS: There were no complications  IMPRESSIONS: Normal entire esophagus  Biopsy taken  Empiric dilation was   planned but not done due to food in the stomach  A 1cm hiatus hernia   found  Normal stomach  Food found in the body of the stomach  Biopsies   done in the stomach  Normal duodenal bulb and 2nd portion of the   duodenum  Three biopsies taken  RECOMMENDATIONS: Follow-up on the results of the biopsy specimens in 2   weeks  Follow-up appointment with Dr Koroma Hidden   gastric emptying study  May   consider doing a pH and manometry study  ESTIMATED BLOOD LOSS:     PATHOLOGY SPECIMENS: Random biopsy taken  Two random biopsies taken from   the body of the stomach  Three random biopsies taken  PROCEDURE CODES:     ICD-9 Codes: 530 81 Esophageal reflux 787 2 DYSPHAGIA 536 8 Dyspepsia and   other specified disorders of function of stomach 553 3 Diaphragmatic   hernia without mention of obstruction or gangrene     ICD-10 Codes: K21 Gastro-esophageal reflux disease R13 10 Dysphagia,   unspecified K30 Functional dyspepsia K44 Diaphragmatic hernia     PERFORMED BY: ADILENE Fan  on 03/02/2018  Version 1, electronically signed by ADILENE Fan  on 03/02/2018 at   13:09

## 2018-03-02 NOTE — H&P (VIEW-ONLY)
[facility] - Outpatient Consultation  Lorna Esparza 38 y o  female MRN: [id number]  Encounter: [id number]          ASSESSMENT AND PLAN:      1  Chronic GERD -She is currently on  Pantoprazole 40 mg twice a day  She continued to have breakthrough symptoms specially at night and while lying supine  We reviewed reflux precautions  I gave her handout on dietary recommendations  I advised her to elevate head of bed by 4-6 inch at bedtime  Avoid eating within 2-3 hours of going to bed  I will schedule her for EGD to assess for esophagitis and hiatal hernia  2  Esophageal dysphagia -   She has  Mild dysphagia to solids such as bread  This is likely secondary to underlying esophagitis rule out esophageal stricture from uncontrolled acid reflux  EGD for further evaluation  3  Dyspepsia -  She reports severe epigastric pain which is concerning for underlying peptic ulcer disease from NSAIDs  I advised her to stop taking  Advil p m  Instead take Tylenol as needed for arthritis  Continue pantoprazole  We will schedule her for EGD as soon as possible to assess for peptic ulcer disease  4  Chronic idiopathic constipation  - I advised her to eat high-fiber diet  Handout provided regarding high-fiber diet  Continued to drink plenty of water  Regular exercise  Start taking Metamucil 1 scoop daily if no improvement you can take MiraLax 17 g daily  I will see her back in the office in about 1 month     ______________________________________________________________________    HPI:    40-year-old female here for management of gastroesophageal reflux disease, dysphagia, epigastric pain and chronic constipation  She had reflux symptoms for over 5 years  Her symptoms include regurgitation and epigastric burning pain  She reports worsening of her symptoms over the last several months    Currently she is on pantoprazole 40 mg daily which is helping with her heartburn and decreased frequency of regurgitation  She also reports severe epigastric pain radiating to her back  She denies nausea, vomiting, hematemesis, or melena  She takes Advil p m  For arthritis  She takes  6-8 tablets per week  He never had EGD in the past   No prior history of peptic ulcer disease  She denies weight loss  She has chronic constipation  Bowel movement 1 to 2 times a week  She reports  Having hard stool with excessive straining  She never had colonoscopy before  We reviewed her dietary habit which includes low-fiber diet  REVIEW OF SYSTEMS:    CONSTITUTIONAL: Denies any fever, chills, rigors, and weight loss  HEENT: No earache or tinnitus  Denies hearing loss or visual disturbances  CARDIOVASCULAR: No chest pain or palpitations  RESPIRATORY: Denies any cough, hemoptysis, shortness of breath or dyspnea on exertion  GASTROINTESTINAL: As noted in the History of Present Illness  GENITOURINARY: No problems with urination  Denies any hematuria or dysuria  NEUROLOGIC: No dizziness or vertigo, denies headaches  MUSCULOSKELETAL: Denies any muscle or joint pain  SKIN: Denies skin rashes or itching  ENDOCRINE: Denies excessive thirst  Denies intolerance to heat or cold  PSYCHOSOCIAL: Denies depression or anxiety  Denies any recent memory loss  Historical Information   Past Medical History:   Diagnosis Date    GERD (gastroesophageal reflux disease)     Hypertension      Past Surgical History:   Procedure Laterality Date    NECK SURGERY      TONSILLECTOMY      TUBAL LIGATION       Social History   History   Alcohol Use    Yes     Comment: socially     History   Drug Use No     History   Smoking Status    Former Smoker   Smokeless Tobacco    Never Used     History reviewed  No pertinent family history      Meds/Allergies       Current Outpatient Prescriptions:     aspirin (ECOTRIN LOW STRENGTH) 81 mg EC tablet    atorvastatin (LIPITOR) 40 mg tablet    cefprozil (CEFZIL) 250 mg tablet    fluticasone (FLONASE) 50 mcg/act nasal spray    lidocaine (XYLOCAINE) 5 % ointment    lisinopril-hydrochlorothiazide (PRINZIDE,ZESTORETIC) 20-25 MG per tablet    pantoprazole (PROTONIX) 40 mg tablet    predniSONE 20 mg tablet    propranolol (INDERAL LA) 160 mg    sertraline (ZOLOFT) 25 mg tablet    No Known Allergies        Objective     Blood pressure 152/89, pulse 64, temperature 97 7 °F (36 5 °C), temperature source Tympanic, height 5' 3" (1 6 m), weight 97 9 kg (215 lb 12 8 oz), last menstrual period 01/19/2018  PHYSICAL EXAM:      General Appearance:   Alert, cooperative, no distress   HEENT:   Normocephalic, atraumatic, anicteric      Neck:  Supple, symmetrical, trachea midline   Lungs:   Clear to auscultation bilaterally; no rales, rhonchi or wheezing; respirations unlabored    Heart[de-identified]   Regular rate and rhythm; no murmur, rub, or gallop  Abdomen:   Soft, epigastric tenderness, no guarding or rigidity, non-distended; normal bowel sounds; no masses, no organomegaly    Genitalia:   Deferred    Rectal:   Deferred    Extremities:  No cyanosis, clubbing or edema    Pulses:  2+ and symmetric    Skin:  No jaundice, rashes, or lesions    Lymph nodes:  No palpable cervical lymphadenopathy        Lab Results:   No visits with results within 1 Day(s) from this visit     Latest known visit with results is:   Appointment on 12/01/2017   Component Date Value    WBC 12/01/2017 7 49     RBC 12/01/2017 4 61     Hemoglobin 12/01/2017 13 4     Hematocrit 12/01/2017 41 5     MCV 12/01/2017 90     [address] 12/01/2017 29 1     MCHC 12/01/2017 32 3     RDW 12/01/2017 13 7     MPV 12/01/2017 11 3     Platelets 01/18/6152 268     nRBC 12/01/2017 0     Neutrophils Relative 12/01/2017 61     Lymphocytes Relative 12/01/2017 30     Monocytes Relative 12/01/2017 7     Eosinophils Relative 12/01/2017 2     Basophils Relative 12/01/2017 0     Neutrophils Absolute 12/01/2017 4 61     Lymphocytes Absolute 12/01/2017 2 25     Monocytes Absolute 12/01/2017 0 49     Eosinophils Absolute 12/01/2017 0 11     Basophils Absolute 12/01/2017 0 02     Sodium 12/01/2017 134*    Potassium 12/01/2017 4 1     Chloride 12/01/2017 102     CO2 12/01/2017 26     Anion Gap 12/01/2017 6     BUN 12/01/2017 15     Creatinine 12/01/2017 0 84     Glucose, Fasting 12/01/2017 116*    Calcium 12/01/2017 8 9     AST 12/01/2017 12     ALT 12/01/2017 34     Alkaline Phosphatase 12/01/2017 80     Total Protein 12/01/2017 7 7     Albumin 12/01/2017 3 9     Total Bilirubin 12/01/2017 1 08*    eGFR 12/01/2017 85     Cholesterol 12/01/2017 205*    Triglycerides 12/01/2017 153*    HDL, Direct 12/01/2017 35*    LDL Calculated 12/01/2017 139*    TSH 3RD GENERATON 12/01/2017 2 750

## 2018-03-02 NOTE — ANESTHESIA POSTPROCEDURE EVALUATION
Post-Op Assessment Note      CV Status:  Stable    Mental Status:  Alert and awake    Hydration Status:  Euvolemic    PONV Controlled:  Controlled    Airway Patency:  Patent    Post Op Vitals Reviewed: Yes          Staff: Anesthesiologist           /62 (03/02/18 1308)    Temp 97 9 °F (36 6 °C) (03/02/18 1308)    Pulse 68 (03/02/18 1308)   Resp 12 (03/02/18 1308)    SpO2 100 % (03/02/18 1308)

## 2018-03-02 NOTE — OP NOTE
**** GI/ENDOSCOPY REPORT ****     PATIENT NAME: AUSTIN Rodney - VISIT ID:     INTRODUCTION: Esophagogastroduodenoscopy - A 38 female patient presents   for an outpatient Esophagogastroduodenoscopy at [facility]  INDICATIONS: GERD  Dysphagia  Dyspepsia  CONSENT: The benefits, risks, and alternatives to the procedure were   discussed and informed consent was obtained from the patient  PREPARATION:  EKG, pulse, pulse oximetry and blood pressure were monitored   throughout the procedure  ASA Classification: Class 3 - Patient has severe   systemic disturbance that may or may not be related to the disorder   requiring surgery  MEDICATIONS: Anesthesia-check records     PROCEDURE:  The endoscope was passed without difficulty through the mouth   under direct visualization and advanced to the 2nd portion of the   duodenum  The scope was withdrawn and the mucosa was carefully examined  FINDINGS:   Esophagus: The entire esophagus appeared to be normal    A   biopsy was taken  The GE junction was observed 41 cm from the entry site  There was a 1 cm hiatus hernia visible in the esophagus  Stomach: The   stomach appeared to be normal  A moderate quantity of food was found in   the body of the stomach  Two random biopsies was taken from the body of   the stomach  Duodenum: The duodenal bulb and 2nd portion of the duodenum   appeared to be normal    Three random biopsies was taken  COMPLICATIONS: There were no complications  IMPRESSIONS: Normal entire esophagus  Biopsy taken  Empiric dilation was   planned but not done due to food in the stomach  A 1cm hiatus hernia   found  Normal stomach  Food found in the body of the stomach  Biopsies   done in the stomach  Normal duodenal bulb and 2nd portion of the   duodenum  Three biopsies taken  Vocal cord nodule  RECOMMENDATIONS: Follow-up on the results of the biopsy specimens in 2   weeks  Follow-up appointment with Dr Jayne Wilkerson  gastric emptying study  May   consider doing a pH and manometry study  ENT evaluation - provided   information for ENT  ESTIMATED BLOOD LOSS:     PATHOLOGY SPECIMENS: Random biopsy taken  Two random biopsies taken from   the body of the stomach  Three random biopsies taken  PROCEDURE CODES:     ICD-9 Codes: 530 81 Esophageal reflux 787 2 DYSPHAGIA 536 8 Dyspepsia and   other specified disorders of function of stomach 553 3 Diaphragmatic   hernia without mention of obstruction or gangrene     ICD-10 Codes: K21 Gastro-esophageal reflux disease R13 10 Dysphagia,   unspecified K30 Functional dyspepsia K44 Diaphragmatic hernia     PERFORMED BY: ADILENE Fan  on 03/02/2018  Version 2, modified and electronically signed by ADILENE Fan  on   03/02/2018 at 13:44, superseding version 1 signed on 03/02/2018 at 13:09

## 2018-03-02 NOTE — ANESTHESIA PREPROCEDURE EVALUATION
Review of Systems/Medical History  Patient summary reviewed  Chart reviewed  No history of anesthetic complications     Cardiovascular  Exercise tolerance: good,  Hypertension ,    Pulmonary  Sleep apnea (witnessed apneas by spouse; no formal dx) ,   Comment: Former smoker     GI/Hepatic    GERD ,        Negative  ROS        Endo/Other    Obesity (BMI 45)    GYN  Not currently pregnant ,          Hematology  Negative hematology ROS      Musculoskeletal  Negative musculoskeletal ROS        Neurology  Negative neurology ROS      Psychology   Negative psychology ROS            Physical Exam    Airway  Comment: Prior ACDF  Mallampati score: III  TM Distance: >3 FB  Neck ROM: limited     Dental   No notable dental hx     Cardiovascular      Pulmonary      Other Findings       Lab Results   Component Value Date    WBC 7 49 12/01/2017    HGB 13 4 12/01/2017     12/01/2017     Lab Results   Component Value Date     (L) 12/01/2017    K 4 1 12/01/2017    BUN 15 12/01/2017    CREATININE 0 84 12/01/2017    GLUCOSE 108 12/01/2016     Anesthesia Plan  ASA Score- 3     Anesthesia Type- IV sedation with anesthesia with ASA Monitors  Additional Monitors:   Airway Plan:         Plan Factors-    Induction- intravenous  Postoperative Plan-     Informed Consent- Anesthetic plan and risks discussed with patient and spouse  I personally reviewed this patient with the CRNA  Discussed and agreed on the Anesthesia Plan with the CRNA  Nereida Chao

## 2018-03-29 ENCOUNTER — HOSPITAL ENCOUNTER (OUTPATIENT)
Dept: NUCLEAR MEDICINE | Facility: HOSPITAL | Age: 43
Discharge: HOME | End: 2018-03-29
Attending: INTERNAL MEDICINE
Payer: COMMERCIAL

## 2018-03-29 DIAGNOSIS — K21.9 GASTROESOPHAGEAL REFLUX DISEASE WITHOUT ESOPHAGITIS: ICD-10-CM

## 2018-03-29 DIAGNOSIS — R10.13 DYSPEPSIA: ICD-10-CM

## 2018-03-29 PROCEDURE — A9541 TC99M SULFUR COLLOID: HCPCS

## 2018-03-29 PROCEDURE — 78264 GASTRIC EMPTYING IMG STUDY: CPT

## 2018-03-30 ENCOUNTER — OFFICE VISIT (OUTPATIENT)
Dept: OTOLARYNGOLOGY | Facility: CLINIC | Age: 43
End: 2018-03-30
Payer: COMMERCIAL

## 2018-03-30 VITALS
HEART RATE: 79 BPM | BODY MASS INDEX: 38.62 KG/M2 | HEIGHT: 63 IN | WEIGHT: 218 LBS | RESPIRATION RATE: 18 BRPM | DIASTOLIC BLOOD PRESSURE: 62 MMHG | SYSTOLIC BLOOD PRESSURE: 124 MMHG

## 2018-03-30 DIAGNOSIS — K21.9 CHRONIC GERD: Primary | ICD-10-CM

## 2018-03-30 DIAGNOSIS — R13.19 ESOPHAGEAL DYSPHAGIA: ICD-10-CM

## 2018-03-30 DIAGNOSIS — J38.7 LEUKOPLAKIA OF LARYNX: ICD-10-CM

## 2018-03-30 PROCEDURE — 31575 DIAGNOSTIC LARYNGOSCOPY: CPT | Performed by: OTOLARYNGOLOGY

## 2018-03-30 PROCEDURE — 99243 OFF/OP CNSLTJ NEW/EST LOW 30: CPT | Performed by: OTOLARYNGOLOGY

## 2018-03-30 NOTE — PROGRESS NOTES
Consultation - Otolaryngology - Head and Neck Surgery  Facial Plastic and Reconstructive Surgery  Familia Mares 38 y o  female MRN: [id number]  Encounter: [id number]        Assessment/Plan:  1  Chronic GERD     2  Esophageal dysphagia     3  Leukoplakia of larynx         Laryngopharyngeal reflux: We discussed the ongoing findings of laryngopharyngeal reflux  We discussed the management of laryngopharyngeal reflux with PPI taken 30 minutes before the evening meal, elevation the head of bed, diet modifications, weight loss, and other lifestyle changes to assist with decreasing laryngopharyngeal reflux  She will follow up with our office in 2 months or ongoing with her GI specialist     Laryngeal lesion: This does not appear to be visible any further this was likely some thickened mucus or other drainage which is cleared  She may have had fungal laryngitis which has resolved but there is no further lesion  Return visit on a p r n  basis with associated  History of Present Illness   Physician Requesting Consult: Luann Smith DO, Jody Carrillo MD, Shannon Turcios MD  Reason for Consult / Principal Problem: Larynx lesion on EGD  HPI: Familia Mares is a 38y o  year old female who presents with history of severe recalcitrant gastroesophageal reflux and symptoms of laryngopharyngeal reflux  She had upper endoscopy performed by Dr Trenton Rodriguez  During the scope E saw a lesion on her larynx which was concerning for a polypoid mass  Patient has a 30 pack-year history of smoking she quit approximately 2 years ago  She has ongoing hoarseness, intermittent sore throat, mild dysphagia, and burning esophageal reflux almost daily  She is currently on Protonix  She has frequent cough and is on lisinopril  No otalgia  No neck mass  No weight loss      She presents today with photographs from the EGD which shows some white material along the edge of the left false vocal fold and some arytenoid hooding overlying the left  Review of systems:  10 Point ROS was performed and negative except as above or otherwise noted in the medical record  Historical Information   Past Medical History:   Diagnosis Date    GERD (gastroesophageal reflux disease)     Hypertension      Past Surgical History:   Procedure Laterality Date    CARPAL TUNNEL RELEASE      NECK SURGERY      [place] ESOPHAGOGASTRODUODENOSCOPY TRANSORAL DIAGNOSTIC N/A 3/2/2018    Procedure: ESOPHAGOGASTRODUODENOSCOPY (EGD); Surgeon: Billie Godinez MD;  Location: [facility];  Service: Gastroenterology    TONSILLECTOMY      TUBAL LIGATION       Social History   History   Alcohol Use    Yes     Comment: socially     History   Drug Use No     History   Smoking Status    Former Smoker   Smokeless Tobacco    Never Used     Family History: No family history on file  Current Outpatient Prescriptions on File Prior to Visit   Medication Sig    aspirin (ECOTRIN LOW STRENGTH) 81 mg EC tablet Take 81 mg by mouth daily    atorvastatin (LIPITOR) 40 mg tablet Take 1 tablet by mouth daily    fluticasone (FLONASE) 50 mcg/act nasal spray 2 sprays into each nostril daily    lidocaine (XYLOCAINE) 5 % ointment     lisinopril-hydrochlorothiazide (PRINZIDE,ZESTORETIC) 20-25 MG per tablet     pantoprazole (PROTONIX) 40 mg tablet Take 1 tablet by mouth 2 (two) times a day    propranolol (INDERAL LA) 160 mg Take by mouth    sertraline (ZOLOFT) 25 mg tablet Take 1 tablet by mouth daily     No current facility-administered medications on file prior to visit  No Known Allergies    Vitals:    03/30/18 0837   BP: 124/62   Pulse: 79   Resp: 18         Physical Exam   Constitutional: Oriented to person, place, and time  Well-developed and well-nourished, no apparent distress, non-toxic appearance  Cooperative, able to hear and answer questions without difficulty  Voice: Normal voice quality  Head: Normocephalic, atraumatic  No scars, masses or lesions    Face: Symmetric, no edema, no sinus tenderness  Eyes: Vision grossly intact, extra-ocular movement intact  Right Ear: External ear normal   Auditory canal clear  Tympanic membrane intact with intact normal landmarks  No post-auricular erythema or tenderness  Left Ear: External ear normal   Auditory canal clear  Tympanic membrane intact with intact normal landmarks  No post-auricular erythema or tenderness  Nose: No concerning external lesions  Mucosa moist  Septum intact, nares clear  Oral cavity: Dentition intact  Mucosa moist, lips normal   Tongue mobile, floor of mouth without concerning lesions  Hard palate unremarkable  No masses or lesions  Oropharynx: Uvula is midline, soft palate normal   Unremarkable oropharyngeal inlet  Tonsils unremarkable  Posterior pharyngeal wall clear  No masses or lesions  Salivary glands:  Parotid glands and submandibular glands symmetric, no enlargement or tenderness  Neck: Normal laryngeal elevation with swallow  Trachea midline  No masses or lesions  No palpable adenopathy  Thyroid: normal in size, unremarkable without tenderness or palpable nodules  Pulmonary/Chest: Normal effort and rate  No respiratory distress  Musculoskeletal: Normal range of motion  Neurological: Cranial nerves 2-12 intact  Skin: Skin is warm and dry  Psychiatric: Normal mood and affect  Procedure: Flexible fiberoptic laryngoscopy    Indications: Evaluate areas of the upper aerodigestive tract not seen on physical exam    Procedure in detail: After informed verbal consent was obtained the nose was anesthetized using 4% lidocaine and neosynephrine spray  After adequate time for anesthesia the scope was guided easily along the nasal cavity floor and into the nasopharynx  The nasopharynx, oropharynx, hypopharynx and larynx were evaluated with the below listed findings  The scope was removed without difficulty and the patient tolerated the procedure well       Findings: No significant mucopurulence or polyposis in bilateral nasal cavities  No lesions or masses of the nasopharynx, oropharynx, hypopharynx, or larynx  Bilateral vocal cords are full mobile  Moderate erythema of true and false vocal cords  Minimal cobblestoning present at posterior hypopharynx  Imaging Studies: I have personally reviewed pertinent reports  Lab Results: I have personally reviewed pertinent lab results  Greater than 40 minutes were spent in consultation  More than 1/2 of that time was spent in counselling and coordination of care

## 2018-03-30 NOTE — PROGRESS NOTES
Review of Systems   Constitutional: Negative  HENT: Positive for sinus pressure  Eyes: Negative  Respiratory: Negative  Cardiovascular: Negative  Gastrointestinal: Negative  Endocrine: Negative  Genitourinary: Negative  Musculoskeletal: Negative  Skin: Negative  Allergic/Immunologic: Negative  Neurological: Negative  Hematological: Negative  Psychiatric/Behavioral: Negative

## 2018-03-30 NOTE — LETTER
March 30, 2018     Maria De Jesus Gregory MD  [address]    Patient: Danny Beebe   YOB: 1975   Date of Visit: 3/30/2018       Dear Dr Brayden Reddy:    Thank you for referring Danny Beebe to me for evaluation  Below are my notes for this consultation  If you have questions, please do not hesitate to call me  I look forward to following your patient along with you  Sincerely,        Conrado Anderson MD        CC: MD Lavell Pace, Karoline Adkins MD  3/30/2018 10:06 AM  Sign at close encounter  Consultation - Otolaryngology - Head and Neck Surgery  Facial Plastic and Reconstructive Surgery  Danny Beebe 38 y o  female MRN: [id number]  Encounter: [id number]        Assessment/Plan:  1  Chronic GERD     2  Esophageal dysphagia     3  Leukoplakia of larynx         Laryngopharyngeal reflux: We discussed the ongoing findings of laryngopharyngeal reflux  We discussed the management of laryngopharyngeal reflux with PPI taken 30 minutes before the evening meal, elevation the head of bed, diet modifications, weight loss, and other lifestyle changes to assist with decreasing laryngopharyngeal reflux  She will follow up with our office in 2 months or ongoing with her GI specialist     Laryngeal lesion: This does not appear to be visible any further this was likely some thickened mucus or other drainage which is cleared  She may have had fungal laryngitis which has resolved but there is no further lesion  Return visit on a p r n  basis with associated  History of Present Illness   Physician Requesting Consult: Lavell Bo DO, Neida Acharya MD, Maria De Jesus Gregory MD  Reason for Consult / Principal Problem: Larynx lesion on EGD  HPI: Danny Beebe is a 38y o  year old female who presents with history of severe recalcitrant gastroesophageal reflux and symptoms of laryngopharyngeal reflux    She had upper endoscopy performed by Dr Mary Kay Sandoval  During the scope E saw a lesion on her larynx which was concerning for a polypoid mass  Patient has a 30 pack-year history of smoking she quit approximately 2 years ago  She has ongoing hoarseness, intermittent sore throat, mild dysphagia, and burning esophageal reflux almost daily  She is currently on Protonix  She has frequent cough and is on lisinopril  No otalgia  No neck mass  No weight loss  She presents today with photographs from the EGD which shows some white material along the edge of the left false vocal fold and some arytenoid hooding overlying the left  Review of systems:  10 Point ROS was performed and negative except as above or otherwise noted in the medical record  Historical Information   Past Medical History:   Diagnosis Date    GERD (gastroesophageal reflux disease)     Hypertension      Past Surgical History:   Procedure Laterality Date    CARPAL TUNNEL RELEASE      NECK SURGERY      [place] ESOPHAGOGASTRODUODENOSCOPY TRANSORAL DIAGNOSTIC N/A 3/2/2018    Procedure: ESOPHAGOGASTRODUODENOSCOPY (EGD); Surgeon: Kitty Nichols MD;  Location: [facility];  Service: Gastroenterology    TONSILLECTOMY      TUBAL LIGATION       Social History   History   Alcohol Use    Yes     Comment: socially     History   Drug Use No     History   Smoking Status    Former Smoker   Smokeless Tobacco    Never Used     Family History: No family history on file      Current Outpatient Prescriptions on File Prior to Visit   Medication Sig    aspirin (ECOTRIN LOW STRENGTH) 81 mg EC tablet Take 81 mg by mouth daily    atorvastatin (LIPITOR) 40 mg tablet Take 1 tablet by mouth daily    fluticasone (FLONASE) 50 mcg/act nasal spray 2 sprays into each nostril daily    lidocaine (XYLOCAINE) 5 % ointment     lisinopril-hydrochlorothiazide (PRINZIDE,ZESTORETIC) 20-25 MG per tablet     pantoprazole (PROTONIX) 40 mg tablet Take 1 tablet by mouth 2 (two) times a day    propranolol (INDERAL LA) 160 mg Take by mouth    sertraline (ZOLOFT) 25 mg tablet Take 1 tablet by mouth daily     No current facility-administered medications on file prior to visit  No Known Allergies    Vitals:    03/30/18 0837   BP: 124/62   Pulse: 79   Resp: 18         Physical Exam   Constitutional: Oriented to person, place, and time  Well-developed and well-nourished, no apparent distress, non-toxic appearance  Cooperative, able to hear and answer questions without difficulty  Voice: Normal voice quality  Head: Normocephalic, atraumatic  No scars, masses or lesions  Face: Symmetric, no edema, no sinus tenderness  Eyes: Vision grossly intact, extra-ocular movement intact  Right Ear: External ear normal   Auditory canal clear  Tympanic membrane intact with intact normal landmarks  No post-auricular erythema or tenderness  Left Ear: External ear normal   Auditory canal clear  Tympanic membrane intact with intact normal landmarks  No post-auricular erythema or tenderness  Nose: No concerning external lesions  Mucosa moist  Septum intact, nares clear  Oral cavity: Dentition intact  Mucosa moist, lips normal   Tongue mobile, floor of mouth without concerning lesions  Hard palate unremarkable  No masses or lesions  Oropharynx: Uvula is midline, soft palate normal   Unremarkable oropharyngeal inlet  Tonsils unremarkable  Posterior pharyngeal wall clear  No masses or lesions  Salivary glands:  Parotid glands and submandibular glands symmetric, no enlargement or tenderness  Neck: Normal laryngeal elevation with swallow  Trachea midline  No masses or lesions  No palpable adenopathy  Thyroid: normal in size, unremarkable without tenderness or palpable nodules  Pulmonary/Chest: Normal effort and rate  No respiratory distress  Musculoskeletal: Normal range of motion  Neurological: Cranial nerves 2-12 intact  Skin: Skin is warm and dry  Psychiatric: Normal mood and affect      Procedure: Flexible fiberoptic laryngoscopy    Indications: Evaluate areas of the upper aerodigestive tract not seen on physical exam    Procedure in detail: After informed verbal consent was obtained the nose was anesthetized using 4% lidocaine and neosynephrine spray  After adequate time for anesthesia the scope was guided easily along the nasal cavity floor and into the nasopharynx  The nasopharynx, oropharynx, hypopharynx and larynx were evaluated with the below listed findings  The scope was removed without difficulty and the patient tolerated the procedure well  Findings: No significant mucopurulence or polyposis in bilateral nasal cavities  No lesions or masses of the nasopharynx, oropharynx, hypopharynx, or larynx  Bilateral vocal cords are full mobile  Moderate erythema of true and false vocal cords  Minimal cobblestoning present at posterior hypopharynx  Imaging Studies: I have personally reviewed pertinent reports  Lab Results: I have personally reviewed pertinent lab results  Greater than 40 minutes were spent in consultation  More than 1/2 of that time was spent in counselling and coordination of care

## 2018-04-03 ENCOUNTER — OFFICE VISIT (OUTPATIENT)
Dept: FAMILY MEDICINE CLINIC | Facility: CLINIC | Age: 43
End: 2018-04-03
Payer: COMMERCIAL

## 2018-04-03 VITALS
TEMPERATURE: 98.6 F | DIASTOLIC BLOOD PRESSURE: 80 MMHG | BODY MASS INDEX: 38.09 KG/M2 | WEIGHT: 215 LBS | HEIGHT: 63 IN | SYSTOLIC BLOOD PRESSURE: 124 MMHG | HEART RATE: 60 BPM | RESPIRATION RATE: 16 BRPM

## 2018-04-03 DIAGNOSIS — M79.7 FIBROMYALGIA: ICD-10-CM

## 2018-04-03 DIAGNOSIS — F51.01 PRIMARY INSOMNIA: Primary | ICD-10-CM

## 2018-04-03 PROBLEM — F32.A MILD DEPRESSION: Status: ACTIVE | Noted: 2017-10-31

## 2018-04-03 PROBLEM — R25.1 TREMOR: Status: ACTIVE | Noted: 2017-09-26

## 2018-04-03 PROCEDURE — 99213 OFFICE O/P EST LOW 20 MIN: CPT | Performed by: INTERNAL MEDICINE

## 2018-04-03 RX ORDER — TRAZODONE HYDROCHLORIDE 50 MG/1
50 TABLET ORAL
Qty: 30 TABLET | Refills: 5 | Status: SHIPPED | OUTPATIENT
Start: 2018-04-03 | End: 2018-09-27 | Stop reason: ALTCHOICE

## 2018-04-03 NOTE — PROGRESS NOTES
Assessment/Plan:       Diagnoses and all orders for this visit:    Primary insomnia  -     traZODone (DESYREL) 50 mg tablet; Take 1 tablet (50 mg total) by mouth daily at bedtime for 180 days    Fibromyalgia          ***    Subjective:      Patient ID: Heide Piper is a 38 y o  female  HPI    The following portions of the patient's history were reviewed and updated as appropriate:   She has a past medical history of Allergic; Depression; Fibromyalgia; GERD (gastroesophageal reflux disease); Hyperlipidemia; and Hypertension  ,   does not have any pertinent problems on file  ,   has a past surgical history that includes Tubal ligation; Neck surgery; Tonsillectomy; Carpal tunnel release; and pr esophagogastroduodenoscopy transoral diagnostic (N/A, 3/2/2018)  ,  family history includes No Known Problems in her father and mother  ,   reports that she quit smoking about 2 years ago  Her smoking use included Cigarettes  She has never used smokeless tobacco  She reports that she drinks alcohol  She reports that she does not use drugs  ,  has No Known Allergies     Current Outpatient Prescriptions   Medication Sig Dispense Refill    aspirin (ECOTRIN LOW STRENGTH) 81 mg EC tablet Take 81 mg by mouth daily      atorvastatin (LIPITOR) 40 mg tablet Take 1 tablet by mouth daily      fluticasone (FLONASE) 50 mcg/act nasal spray 2 sprays into each nostril daily      lidocaine (XYLOCAINE) 5 % ointment       lisinopril-hydrochlorothiazide (PRINZIDE,ZESTORETIC) 20-25 MG per tablet       pantoprazole (PROTONIX) 40 mg tablet Take 1 tablet by mouth 2 (two) times a day      propranolol (INDERAL LA) 160 mg Take by mouth      sertraline (ZOLOFT) 25 mg tablet Take 1 tablet by mouth daily      traZODone (DESYREL) 50 mg tablet Take 1 tablet (50 mg total) by mouth daily at bedtime for 180 days 30 tablet 5     No current facility-administered medications for this visit          Review of Systems   Constitutional: Negative for chills, fatigue and fever  HENT: Negative  Respiratory: Negative  Cardiovascular: Negative  Gastrointestinal: Negative  Musculoskeletal: Positive for myalgias  Neurological: Negative  Psychiatric/Behavioral: Negative  Objective:  Vitals:    04/03/18 1340   BP: 124/80   BP Location: Left arm   Patient Position: Sitting   Cuff Size: Large   Pulse: 60   Resp: 16   Temp: 98 6 °F (37 °C)   TempSrc: Tympanic   Weight: 97 5 kg (215 lb)   Height: 5' 3" (1 6 m)     Body mass index is 38 09 kg/m²  Physical Exam   Constitutional: She is oriented to person, place, and time  She appears well-developed and well-nourished  HENT:   Head: Normocephalic and atraumatic  Eyes: EOM are normal  Pupils are equal, round, and reactive to light  Pulmonary/Chest: Effort normal    Musculoskeletal: Normal range of motion  Neurological: She is alert and oriented to person, place, and time

## 2018-04-03 NOTE — PROGRESS NOTES
Assessment/Plan:       Diagnoses and all orders for this visit:    Primary insomnia  -     traZODone (DESYREL) 50 mg tablet; Take 1 tablet (50 mg total) by mouth daily at bedtime for 180 days    Fibromyalgia          The patient was given Trazodone to help with sleep  We will work to titrate the Zoloft down and trial her on Duloxetine  The insurance company has had issues with Lyrica in the past   I noted that exercise (low impact aerobic would be the best choice)  Subjective:      Patient ID: Federica Reaves is a 38 y o  female  The patient is having significant worsening of her fibromyalgia and states that she has diffuse pain all over  She notes that it has become difficult to continue to work  She notes no other concerns at this time  We discussed the main pilars of treatment aerobic exercise and sleep and the patient states that she would like to try something to help with insomnia  The following portions of the patient's history were reviewed and updated as appropriate:   She has a past medical history of Allergic; Depression; Fibromyalgia; GERD (gastroesophageal reflux disease); Hyperlipidemia; and Hypertension  ,   does not have any pertinent problems on file  ,   has a past surgical history that includes Tubal ligation; Neck surgery; Tonsillectomy; Carpal tunnel release; and pr esophagogastroduodenoscopy transoral diagnostic (N/A, 3/2/2018)  ,  family history includes No Known Problems in her father and mother  ,   reports that she quit smoking about 2 years ago  Her smoking use included Cigarettes  She has never used smokeless tobacco  She reports that she drinks alcohol  She reports that she does not use drugs  ,  has No Known Allergies     Current Outpatient Prescriptions   Medication Sig Dispense Refill    aspirin (ECOTRIN LOW STRENGTH) 81 mg EC tablet Take 81 mg by mouth daily      atorvastatin (LIPITOR) 40 mg tablet Take 1 tablet by mouth daily      fluticasone (FLONASE) 50 mcg/act nasal spray 2 sprays into each nostril daily      lidocaine (XYLOCAINE) 5 % ointment       lisinopril-hydrochlorothiazide (PRINZIDE,ZESTORETIC) 20-25 MG per tablet       pantoprazole (PROTONIX) 40 mg tablet Take 1 tablet by mouth 2 (two) times a day      propranolol (INDERAL LA) 160 mg Take by mouth      sertraline (ZOLOFT) 25 mg tablet Take 1 tablet by mouth daily      traZODone (DESYREL) 50 mg tablet Take 1 tablet (50 mg total) by mouth daily at bedtime for 180 days 30 tablet 5     No current facility-administered medications for this visit  Review of Systems   Constitutional: Negative for chills, fatigue and fever  HENT: Negative for ear pain, sinus pain, sinus pressure and sore throat  Respiratory: Negative for chest tightness and shortness of breath  Cardiovascular: Negative for chest pain and palpitations  Gastrointestinal: Negative for abdominal pain, constipation, diarrhea, nausea and vomiting  Genitourinary: Negative  Musculoskeletal: Negative for arthralgias and myalgias  Skin: Negative  Neurological: Negative  Psychiatric/Behavioral: Negative  Objective:  Vitals:    04/03/18 1340   BP: 124/80   BP Location: Left arm   Patient Position: Sitting   Cuff Size: Large   Pulse: 60   Resp: 16   Temp: 98 6 °F (37 °C)   TempSrc: Tympanic   Weight: 97 5 kg (215 lb)   Height: 5' 3" (1 6 m)     Body mass index is 38 09 kg/m²       Physical Exam

## 2018-04-03 NOTE — PROGRESS NOTES
Assessment/Plan:       Diagnoses and all orders for this visit:    Primary insomnia  -     traZODone (DESYREL) 50 mg tablet; Take 1 tablet (50 mg total) by mouth daily at bedtime for 180 days    Fibromyalgia          We will try Duloxetine next visit  Subjective:      Patient ID: Danyelle Burdick is a 38 y o  female  HPI    The following portions of the patient's history were reviewed and updated as appropriate:   She has a past medical history of Allergic; Depression; Fibromyalgia; GERD (gastroesophageal reflux disease); Hyperlipidemia; and Hypertension  ,   does not have any pertinent problems on file  ,   has a past surgical history that includes Tubal ligation; Neck surgery; Tonsillectomy; Carpal tunnel release; and pr esophagogastroduodenoscopy transoral diagnostic (N/A, 3/2/2018)  ,  family history includes No Known Problems in her father and mother  ,   reports that she quit smoking about 2 years ago  Her smoking use included Cigarettes  She has never used smokeless tobacco  She reports that she drinks alcohol  She reports that she does not use drugs  ,  has No Known Allergies     Current Outpatient Prescriptions   Medication Sig Dispense Refill    aspirin (ECOTRIN LOW STRENGTH) 81 mg EC tablet Take 81 mg by mouth daily      atorvastatin (LIPITOR) 40 mg tablet Take 1 tablet by mouth daily      fluticasone (FLONASE) 50 mcg/act nasal spray 2 sprays into each nostril daily      lidocaine (XYLOCAINE) 5 % ointment       lisinopril-hydrochlorothiazide (PRINZIDE,ZESTORETIC) 20-25 MG per tablet       pantoprazole (PROTONIX) 40 mg tablet Take 1 tablet by mouth 2 (two) times a day      propranolol (INDERAL LA) 160 mg Take by mouth      sertraline (ZOLOFT) 25 mg tablet Take 1 tablet by mouth daily      traZODone (DESYREL) 50 mg tablet Take 1 tablet (50 mg total) by mouth daily at bedtime for 180 days 30 tablet 5     No current facility-administered medications for this visit          Review of Systems Objective:  Vitals:    04/03/18 1340   BP: 124/80   BP Location: Left arm   Patient Position: Sitting   Cuff Size: Large   Pulse: 60   Resp: 16   Temp: 98 6 °F (37 °C)   TempSrc: Tympanic   Weight: 97 5 kg (215 lb)   Height: 5' 3" (1 6 m)     Body mass index is 38 09 kg/m²       Physical Exam

## 2018-04-17 ENCOUNTER — OFFICE VISIT (OUTPATIENT)
Dept: FAMILY MEDICINE CLINIC | Facility: CLINIC | Age: 43
End: 2018-04-17
Payer: COMMERCIAL

## 2018-04-17 VITALS
DIASTOLIC BLOOD PRESSURE: 76 MMHG | SYSTOLIC BLOOD PRESSURE: 110 MMHG | RESPIRATION RATE: 20 BRPM | BODY MASS INDEX: 38.09 KG/M2 | TEMPERATURE: 98.7 F | HEART RATE: 72 BPM | WEIGHT: 215 LBS | HEIGHT: 63 IN

## 2018-04-17 DIAGNOSIS — J30.1 SEASONAL ALLERGIC RHINITIS DUE TO POLLEN: Primary | ICD-10-CM

## 2018-04-17 DIAGNOSIS — F32.A MILD DEPRESSION: ICD-10-CM

## 2018-04-17 DIAGNOSIS — J01.10 ACUTE NON-RECURRENT FRONTAL SINUSITIS: ICD-10-CM

## 2018-04-17 PROCEDURE — 3008F BODY MASS INDEX DOCD: CPT | Performed by: INTERNAL MEDICINE

## 2018-04-17 PROCEDURE — 99214 OFFICE O/P EST MOD 30 MIN: CPT | Performed by: INTERNAL MEDICINE

## 2018-04-17 RX ORDER — CEFUROXIME AXETIL 250 MG/1
250 TABLET ORAL EVERY 12 HOURS SCHEDULED
Qty: 14 TABLET | Refills: 0 | Status: SHIPPED | OUTPATIENT
Start: 2018-04-17 | End: 2018-04-24

## 2018-04-17 RX ORDER — MONTELUKAST SODIUM 10 MG/1
10 TABLET ORAL
Qty: 30 TABLET | Refills: 5 | Status: SHIPPED | OUTPATIENT
Start: 2018-04-17 | End: 2018-09-27 | Stop reason: ALTCHOICE

## 2018-04-17 RX ORDER — SERTRALINE HYDROCHLORIDE 25 MG/1
25 TABLET, FILM COATED ORAL DAILY
Qty: 30 TABLET | Refills: 5 | Status: SHIPPED | OUTPATIENT
Start: 2018-04-17 | End: 2018-09-27 | Stop reason: ALTCHOICE

## 2018-04-17 NOTE — PROGRESS NOTES
Assessment/Plan:       Diagnoses and all orders for this visit:    Seasonal allergic rhinitis due to pollen  -     montelukast (SINGULAIR) 10 mg tablet; Take 1 tablet (10 mg total) by mouth daily at bedtime for 180 days    Acute non-recurrent frontal sinusitis  -     cefuroxime (CEFTIN) 250 mg tablet; Take 1 tablet (250 mg total) by mouth every 12 (twelve) hours for 7 days    Mild depression (HCC)  -     sertraline (ZOLOFT) 25 mg tablet; Take 1 tablet (25 mg total) by mouth daily for 180 days          We will restart the Sertraline and continue this for another 6 months and trial her off it in October of this year  Subjective:      Patient ID: Aundrea Hsu is a 37 y o  female  The patient notes that cessation of the Zoloft caused her to have worsening symptoms  The patient states that she is tearful and emotionally labile  The patient states that she is still having issues with sleep and is using the Trazodone on a PRN basis  The patient states that she has some anhedonism  We discussed and she was amendable to starting the Zoloft again  Sinusitis   This is a chronic problem  The problem is unchanged  There has been no fever  She is experiencing no pain  Associated symptoms include congestion, coughing and diaphoresis  Pertinent negatives include no chills, ear pain, headaches, hoarse voice, neck pain, shortness of breath, sinus pressure, sore throat or swollen glands  Past treatments include nothing  The following portions of the patient's history were reviewed and updated as appropriate:   She has a past medical history of Allergic; Depression; Fibromyalgia; GERD (gastroesophageal reflux disease); Hyperlipidemia; and Hypertension  ,   does not have any pertinent problems on file  ,   has a past surgical history that includes Tubal ligation; Neck surgery; Tonsillectomy; Carpal tunnel release; and pr esophagogastroduodenoscopy transoral diagnostic (N/A, 3/2/2018)  ,  family history includes No Known Problems in her father and mother  ,   reports that she quit smoking about 2 years ago  Her smoking use included Cigarettes  She has never used smokeless tobacco  She reports that she drinks alcohol  She reports that she does not use drugs  ,  has No Known Allergies     Current Outpatient Prescriptions   Medication Sig Dispense Refill    aspirin (ECOTRIN LOW STRENGTH) 81 mg EC tablet Take 81 mg by mouth daily      atorvastatin (LIPITOR) 40 mg tablet Take 1 tablet by mouth daily      fluticasone (FLONASE) 50 mcg/act nasal spray 2 sprays into each nostril daily      lisinopril-hydrochlorothiazide (PRINZIDE,ZESTORETIC) 20-25 MG per tablet Take 1 tablet by mouth daily        pantoprazole (PROTONIX) 40 mg tablet Take 1 tablet by mouth 2 (two) times a day      propranolol (INDERAL LA) 160 mg Take 160 mg by mouth daily        sertraline (ZOLOFT) 25 mg tablet Take 1 tablet (25 mg total) by mouth daily for 180 days 30 tablet 5    traZODone (DESYREL) 50 mg tablet Take 1 tablet (50 mg total) by mouth daily at bedtime for 180 days 30 tablet 5    cefuroxime (CEFTIN) 250 mg tablet Take 1 tablet (250 mg total) by mouth every 12 (twelve) hours for 7 days 14 tablet 0    lidocaine (XYLOCAINE) 5 % ointment       montelukast (SINGULAIR) 10 mg tablet Take 1 tablet (10 mg total) by mouth daily at bedtime for 180 days 30 tablet 5     No current facility-administered medications for this visit  Review of Systems   Constitutional: Positive for diaphoresis  Negative for chills  HENT: Positive for congestion  Negative for ear pain, hoarse voice, sinus pressure and sore throat  Respiratory: Positive for cough  Negative for shortness of breath  Cardiovascular: Negative for chest pain and palpitations  Gastrointestinal: Negative for abdominal pain, constipation, diarrhea, nausea and vomiting  Genitourinary: Negative  Musculoskeletal: Negative for arthralgias, myalgias and neck pain     Neurological: Negative for headaches  Psychiatric/Behavioral: Positive for dysphoric mood  Objective:  Vitals:    04/17/18 1527   BP: 110/76   BP Location: Left arm   Patient Position: Sitting   Cuff Size: Large   Pulse: 72   Resp: 20   Temp: 98 7 °F (37 1 °C)   TempSrc: Tympanic   Weight: 97 5 kg (215 lb)   Height: 5' 3" (1 6 m)     Body mass index is 38 09 kg/m²  Physical Exam   Constitutional: She is oriented to person, place, and time  She appears well-developed and well-nourished  HENT:   Head: Normocephalic and atraumatic  Nose: Rhinorrhea present  Mouth/Throat: Posterior oropharyngeal erythema present  Cardiovascular: Normal rate, regular rhythm, normal heart sounds and intact distal pulses  Pulmonary/Chest: Effort normal and breath sounds normal    Abdominal: Soft  Bowel sounds are normal    Musculoskeletal: Normal range of motion  Neurological: She is alert and oriented to person, place, and time  She has normal reflexes  Skin: Skin is warm and dry

## 2018-08-16 ENCOUNTER — APPOINTMENT (OUTPATIENT)
Dept: LAB | Facility: CLINIC | Age: 43
End: 2018-08-16
Payer: COMMERCIAL

## 2018-08-16 ENCOUNTER — TRANSCRIBE ORDERS (OUTPATIENT)
Dept: LAB | Facility: CLINIC | Age: 43
End: 2018-08-16

## 2018-08-16 DIAGNOSIS — Z79.899 LONG TERM USE OF DRUG: Primary | ICD-10-CM

## 2018-08-16 LAB
ALBUMIN SERPL BCP-MCNC: 3.6 G/DL (ref 3.5–5)
ALP SERPL-CCNC: 75 U/L (ref 46–116)
ALT SERPL W P-5'-P-CCNC: 19 U/L (ref 12–78)
ANION GAP SERPL CALCULATED.3IONS-SCNC: 8 MMOL/L (ref 4–13)
AST SERPL W P-5'-P-CCNC: 12 U/L (ref 5–45)
BILIRUB SERPL-MCNC: 0.69 MG/DL (ref 0.2–1)
BUN SERPL-MCNC: 12 MG/DL (ref 5–25)
CALCIUM SERPL-MCNC: 8.7 MG/DL (ref 8.3–10.1)
CHLORIDE SERPL-SCNC: 110 MMOL/L (ref 100–108)
CO2 SERPL-SCNC: 24 MMOL/L (ref 21–32)
CREAT SERPL-MCNC: 0.77 MG/DL (ref 0.6–1.3)
GFR SERPL CREATININE-BSD FRML MDRD: 94 ML/MIN/1.73SQ M
GLUCOSE P FAST SERPL-MCNC: 103 MG/DL (ref 65–99)
POTASSIUM SERPL-SCNC: 3.8 MMOL/L (ref 3.5–5.3)
PROT SERPL-MCNC: 6.9 G/DL (ref 6.4–8.2)
SODIUM SERPL-SCNC: 142 MMOL/L (ref 136–145)

## 2018-08-16 PROCEDURE — 36415 COLL VENOUS BLD VENIPUNCTURE: CPT

## 2018-08-16 PROCEDURE — 80053 COMPREHEN METABOLIC PANEL: CPT

## 2018-08-29 DIAGNOSIS — E78.00 HYPERCHOLESTEROLEMIA: Primary | ICD-10-CM

## 2018-08-29 RX ORDER — ATORVASTATIN CALCIUM 40 MG/1
TABLET, FILM COATED ORAL
Qty: 90 TABLET | Refills: 3 | Status: SHIPPED | OUTPATIENT
Start: 2018-08-29 | End: 2019-08-26 | Stop reason: SDUPTHER

## 2018-09-21 DIAGNOSIS — I10 BENIGN ESSENTIAL HTN: Primary | ICD-10-CM

## 2018-09-21 RX ORDER — LISINOPRIL AND HYDROCHLOROTHIAZIDE 25; 20 MG/1; MG/1
TABLET ORAL
Qty: 90 TABLET | Refills: 2 | Status: SHIPPED | OUTPATIENT
Start: 2018-09-21 | End: 2019-06-18 | Stop reason: SDUPTHER

## 2018-09-27 ENCOUNTER — OFFICE VISIT (OUTPATIENT)
Dept: URGENT CARE | Facility: CLINIC | Age: 43
End: 2018-09-27
Payer: COMMERCIAL

## 2018-09-27 ENCOUNTER — HOSPITAL ENCOUNTER (EMERGENCY)
Facility: HOSPITAL | Age: 43
Discharge: HOME | End: 2018-09-27
Payer: COMMERCIAL

## 2018-09-27 VITALS
TEMPERATURE: 97.6 F | SYSTOLIC BLOOD PRESSURE: 128 MMHG | HEIGHT: 63 IN | WEIGHT: 215 LBS | OXYGEN SATURATION: 99 % | RESPIRATION RATE: 16 BRPM | DIASTOLIC BLOOD PRESSURE: 77 MMHG | BODY MASS INDEX: 38.09 KG/M2 | HEART RATE: 74 BPM

## 2018-09-27 VITALS
HEIGHT: 63 IN | RESPIRATION RATE: 18 BRPM | WEIGHT: 215 LBS | TEMPERATURE: 98.5 F | HEART RATE: 72 BPM | SYSTOLIC BLOOD PRESSURE: 124 MMHG | BODY MASS INDEX: 38.09 KG/M2 | DIASTOLIC BLOOD PRESSURE: 64 MMHG | OXYGEN SATURATION: 97 %

## 2018-09-27 DIAGNOSIS — J02.9 PHARYNGITIS, UNSPECIFIED ETIOLOGY: Primary | ICD-10-CM

## 2018-09-27 DIAGNOSIS — J02.9 SORE THROAT: Primary | ICD-10-CM

## 2018-09-27 LAB — S PYO AG THROAT QL: NEGATIVE

## 2018-09-27 PROCEDURE — 99213 OFFICE O/P EST LOW 20 MIN: CPT | Performed by: PHYSICIAN ASSISTANT

## 2018-09-27 PROCEDURE — 99282 EMERGENCY DEPT VISIT SF MDM: CPT

## 2018-09-27 PROCEDURE — 87430 STREP A AG IA: CPT | Performed by: PHYSICIAN ASSISTANT

## 2018-09-27 RX ORDER — CLINDAMYCIN HYDROCHLORIDE 300 MG/1
300 CAPSULE ORAL EVERY 6 HOURS
Qty: 28 CAPSULE | Refills: 0 | Status: SHIPPED | OUTPATIENT
Start: 2018-09-27 | End: 2018-10-04

## 2018-09-27 RX ORDER — METHYLPREDNISOLONE 4 MG/1
TABLET ORAL
Qty: 21 TABLET | Refills: 0 | Status: SHIPPED | OUTPATIENT
Start: 2018-09-27 | End: 2018-10-18

## 2018-09-27 RX ORDER — TOPIRAMATE 50 MG/1
50 TABLET, FILM COATED ORAL DAILY
COMMUNITY
Start: 2018-09-15

## 2018-09-27 NOTE — DISCHARGE INSTRUCTIONS
Pharyngitis   WHAT YOU NEED TO KNOW:   Pharyngitis, or sore throat, is inflammation of the tissues and structures in your pharynx (throat)  Pharyngitis is most often caused by bacteria  It may also be caused by a cold or flu virus  Other causes include smoking, allergies, or acid reflux  DISCHARGE INSTRUCTIONS:   Call 911 for any of the following:   · You have trouble breathing or swallowing because your throat is swollen or sore  Return to the emergency department if:   · You are drooling because it hurts too much to swallow  · Your fever is higher than 102? F (39?C) or lasts longer than 3 days  · You are confused  · You taste blood in your throat  Contact your healthcare provider if:   · Your throat pain gets worse  · You have a painful lump in your throat that does not go away after 5 days  · Your symptoms do not improve after 5 days  · You have questions or concerns about your condition or care  Medicines:  Viral pharyngitis will go away on its own without treatment  Your sore throat should start to feel better in 3 to 5 days for both viral and bacterial infections  You may need any of the following:  · Antibiotics  treat a bacterial infection  · NSAIDs , such as ibuprofen, help decrease swelling, pain, and fever  NSAIDs can cause stomach bleeding or kidney problems in certain people  If you take blood thinner medicine, always ask your healthcare provider if NSAIDs are safe for you  Always read the medicine label and follow directions  · Acetaminophen  decreases pain and fever  It is available without a doctor's order  Ask how much to take and how often to take it  Follow directions  Acetaminophen can cause liver damage if not taken correctly  · Take your medicine as directed  Contact your healthcare provider if you think your medicine is not helping or if you have side effects  Tell him or her if you are allergic to any medicine   Keep a list of the medicines, vitamins, and herbs you take  Include the amounts, and when and why you take them  Bring the list or the pill bottles to follow-up visits  Carry your medicine list with you in case of an emergency  Manage your symptoms:   · Gargle salt water  Mix ¼ teaspoon salt in an 8 ounce glass of warm water and gargle  This may help decrease swelling in your throat  · Drink liquids as directed  You may need to drink more liquids than usual  Liquids may help soothe your throat and prevent dehydration  Ask how much liquid to drink each day and which liquids are best for you  · Use a cool-steam humidifier  to help moisten the air in your room and calm your cough  · Soothe your throat  with cough drops, ice, soft foods, or popsicles  Prevent the spread of pharyngitis:  Cover your mouth and nose when you cough or sneeze  Do not share food or drinks  Wash your hands often  Use soap and water  If soap and water are unavailable, use an alcohol based hand   Follow up with your healthcare provider as directed:  Write down your questions so you remember to ask them during your visits  © 2017 [facility] Information is for End User's use only and may not be sold, redistributed or otherwise used for commercial purposes  All illustrations and images included in CareNotes® are the copyrighted property of [employer]  or [employer]  The above information is an  only  It is not intended as medical advice for individual conditions or treatments  Talk to your doctor, nurse or pharmacist before following any medical regimen to see if it is safe and effective for you  Pharyngitis   WHAT YOU NEED TO KNOW:   What is pharyngitis? Pharyngitis, or sore throat, is inflammation of the tissues and structures in your pharynx (throat)  Pharyngitis is most often caused by bacteria  It may also be caused by a cold or flu virus  Other causes include smoking, allergies, or acid reflux     What signs and symptoms may occur with pharyngitis? · Sore throat or pain when you swallow    · Fever, chills, and body aches    · Hoarse or raspy voice    · Cough, runny or stuffy nose, itchy or watery eyes    · Headache    · Upset stomach and loss of appetite    · Mild neck stiffness    · Swollen glands that feel like hard lumps when you touch your neck    · White and yellow pus-filled blisters in the back of your throat  How is pharyngitis diagnosed? Tell your healthcare provider about your symptoms  He may look inside your throat and feel your neck  You may also need the following tests:  · A throat culture  may show which germ is causing your sore throat  A cotton swab is rubbed against the back of your throat  · Blood tests  may be used to show if another medical condition is causing your sore throat  How is pharyngitis treated? Viral pharyngitis will go away on its own without treatment  Your sore throat should start to feel better in 3 to 5 days for both viral and bacterial infections  You may need any of the following:  · Antibiotics  treat a bacterial infection  · NSAIDs , such as ibuprofen, help decrease swelling, pain, and fever  NSAIDs can cause stomach bleeding or kidney problems in certain people  If you take blood thinner medicine, always ask your healthcare provider if NSAIDs are safe for you  Always read the medicine label and follow directions  · Acetaminophen  decreases pain and fever  It is available without a doctor's order  Ask how much to take and how often to take it  Follow directions  Acetaminophen can cause liver damage if not taken correctly  How can I manage my symptoms? · Gargle salt water  Mix ¼ teaspoon salt in an 8 ounce glass of warm water and gargle  This may help decrease swelling in your throat  · Drink liquids as directed  You may need to drink more liquids than usual  Liquids may help soothe your throat and prevent dehydration   Ask how much liquid to drink each day and which liquids are best for you  · Use a cool-steam humidifier  to help moisten the air in your room and decrease your cough  · Soothe your throat  with cough drops, ice, soft foods, or popsicles  How can I prevent the spread of pharyngitis? Cover your mouth and nose when you cough or sneeze  Do not share food or drinks  Wash your hands often  Use soap and water  If soap and water are unavailable, use an alcohol based hand   Call 911 for any of the following:   · You have trouble breathing or swallowing because your throat is swollen or sore  When should I seek immediate care? · You are drooling because it hurts too much to swallow  · Your fever is higher than 102? F (39?C) or lasts longer than 3 days  · You are confused  · You taste blood in your throat  When should I contact my healthcare provider? · Your throat pain gets worse  · You have a painful lump in your throat that does not go away after 5 days  · Your symptoms do not improve after 5 days  · You have questions or concerns about your condition or care  CARE AGREEMENT:   You have the right to help plan your care  Learn about your health condition and how it may be treated  Discuss treatment options with your caregivers to decide what care you want to receive  You always have the right to refuse treatment  The above information is an  only  It is not intended as medical advice for individual conditions or treatments  Talk to your doctor, nurse or pharmacist before following any medical regimen to see if it is safe and effective for you  © 2017 [address] [employer] Information is for End User's use only and may not be sold, redistributed or otherwise used for commercial purposes  All illustrations and images included in CareNotes® are the copyrighted property of A D A M [employer] Inc  or [employer]

## 2018-09-27 NOTE — ED PROVIDER NOTES
History  Chief Complaint   Patient presents with    Sore Throat     sent in by urgent care for evaluation of sore thraot  Monalisa Parks is a 41-year-old female who came to the emergency department due to sore throat to started about 3 days prior to arrival   He was associated with low-grade fever which was relieved by Tylenol which she took today  Patient denies difficulty swallowing but he some mild pain swallowing  She denies shortness of breath or chest pain  History provided by:  Patient   used: No    Sore Throat   Location:  Generalized  Severity:  Mild  Onset quality:  Gradual  Duration:  3 days  Timing:  Constant  Progression:  Unchanged  Chronicity:  New  Relieved by:  Nothing  Worsened by:  Nothing  Ineffective treatments:  None tried  Associated symptoms: fever    Associated symptoms: no abdominal pain, no adenopathy, no chest pain, no chills, no cough, no drooling, no ear discharge, no ear pain, no epistaxis, no eye discharge, no headaches, no neck stiffness, no night sweats, no plugged ear sensation, no postnasal drip, no rash, no rhinorrhea, no shortness of breath, no sinus congestion, no stridor, no trouble swallowing and no voice change    Fever:     Duration:  2 days    Timing:  Intermittent    Temp source:  Subjective    Progression:  Resolved  Risk factors: no exposure to strep, no exposure to mono, no sick contacts, no recent dental procedure, no recent endoscopy and no recent ENT procedure        Prior to Admission Medications   Prescriptions Last Dose Informant Patient Reported?  Taking?   aspirin (ECOTRIN LOW STRENGTH) 81 mg EC tablet  Self Yes No   Sig: Take 81 mg by mouth daily   atorvastatin (LIPITOR) 40 mg tablet   No No   Sig: TAKE 1 TABLET DAILY AS DIRECTED   fluticasone (FLONASE) 50 mcg/act nasal spray  Self Yes No   Si sprays into each nostril daily   lisinopril-hydrochlorothiazide (PRINZIDE,ZESTORETIC) 20-25 MG per tablet   No No   Sig: TAKE 1 TABLET DAILY   propranolol (INDERAL LA) 160 mg  Self Yes No   Sig: Take 160 mg by mouth daily     topiramate (TOPAMAX) 50 MG tablet   Yes No      Facility-Administered Medications: None       Past Medical History:   Diagnosis Date    Acrochordon     last assessed: 1/22/2015    Allergic     Arthralgia of elbow, left     last assessed: 3/19/2015    Closed fracture of seven ribs     last assessed: 9/5/2013    Depression     Dysfunction of left eustachian tube     last assessed: 10/9/2014    Fibromyalgia     GERD (gastroesophageal reflux disease)     Hyperlipidemia     Hypertension     Nicotine dependence     last assessed: 12/18/2014    Ovarian cyst     last assessed: 8/1/2013    Urinary frequency     last assessed: 8/3/2015       Past Surgical History:   Procedure Laterality Date    CARPAL TUNNEL RELEASE      NECK SURGERY      MN ESOPHAGOGASTRODUODENOSCOPY TRANSORAL DIAGNOSTIC N/A 3/2/2018    Procedure: ESOPHAGOGASTRODUODENOSCOPY (EGD); Surgeon: Pari Lin MD;  Location: [facility];  Service: Gastroenterology    TONSILLECTOMY      TUBAL LIGATION         Family History   Problem Relation Age of Onset    Diabetes type II Mother         mellitus    No Known Problems Father     Cerebral aneurysm Maternal Grandmother         anterior cerebral artery    Coronary artery disease Maternal Grandfather     Diabetes type II Maternal Grandfather         mellitus    Lung cancer Paternal Grandfather     Other Paternal Grandfather         [employer]al [employer]' Pneumoconiosis (complicated)    Cerebral aneurysm Maternal Uncle         anterior cerebral artery     I have reviewed and agree with the history as documented  Social History   Substance Use Topics    Smoking status: Former Smoker     Types: Cigarettes     Quit date: 1/9/2016    Smokeless tobacco: Never Used    Alcohol use Yes      Comment: socially        Review of Systems   Constitutional: Positive for fever   Negative for chills and night sweats  HENT: Positive for sore throat  Negative for drooling, ear discharge, ear pain, nosebleeds, postnasal drip, rhinorrhea, trouble swallowing and voice change  Eyes: Negative for discharge  Respiratory: Negative for cough, shortness of breath and stridor  Cardiovascular: Negative for chest pain  Gastrointestinal: Negative for abdominal pain  Endocrine: Negative  Genitourinary: Negative  Musculoskeletal: Negative  Negative for neck stiffness  Skin: Negative for rash  Allergic/Immunologic: Negative  Neurological: Negative for headaches  Hematological: Negative for adenopathy  Psychiatric/Behavioral: Negative  Physical Exam  Physical Exam   Constitutional: She is oriented to person, place, and time  She appears well-developed and well-nourished  No distress  HENT:   Head: Normocephalic and atraumatic  Right Ear: External ear normal    Left Ear: External ear normal    Mouth/Throat: Posterior oropharyngeal erythema present  No oropharyngeal exudate, posterior oropharyngeal edema or tonsillar abscesses  Eyes: Pupils are equal, round, and reactive to light  Conjunctivae and EOM are normal  Right eye exhibits no discharge  Left eye exhibits no discharge  No scleral icterus  Neck: Normal range of motion  Neck supple  No tracheal deviation present  No thyromegaly present  Cardiovascular: Normal rate, regular rhythm, normal heart sounds and intact distal pulses  Pulmonary/Chest: Effort normal and breath sounds normal  No respiratory distress  Abdominal: Soft  Bowel sounds are normal  She exhibits no distension  There is no tenderness  Musculoskeletal: Normal range of motion  She exhibits no edema, tenderness or deformity  Lymphadenopathy:     She has no cervical adenopathy  Neurological: She is alert and oriented to person, place, and time  No cranial nerve deficit or sensory deficit  She exhibits normal muscle tone   Coordination normal    Skin: Skin is warm and dry  No rash noted  She is not diaphoretic  No erythema  No pallor  Psychiatric: She has a normal mood and affect  Her behavior is normal  Judgment and thought content normal    Nursing note and vitals reviewed  Vital Signs  ED Triage Vitals [09/27/18 1123]   Temperature Pulse Respirations Blood Pressure SpO2   97 6 °F (36 4 °C) 74 16 128/77 99 %      Temp Source Heart Rate Source Patient Position - Orthostatic VS BP Location FiO2 (%)   Temporal Monitor Sitting Left arm --      Pain Score       4           Vitals:    09/27/18 1123   BP: 128/77   Pulse: 74   Patient Position - Orthostatic VS: Sitting       Visual Acuity      ED Medications  Medications - No data to display    Diagnostic Studies  Results Reviewed     None                 No orders to display              Procedures  Procedures       Phone Contacts  ED Phone Contact    ED Course                               MDM  Number of Diagnoses or Management Options  Pharyngitis, unspecified etiology: minor  Risk of Complications, Morbidity, and/or Mortality  Presenting problems: minimal  Diagnostic procedures: minimal  Management options: minimal    Patient Progress  Patient progress: stable    CritCare Time    Disposition  Final diagnoses:   Pharyngitis, unspecified etiology     Time reflects when diagnosis was documented in both MDM as applicable and the Disposition within this note     Time User Action Codes Description Comment    9/27/2018 11:29 AM Demarco Coughlin Add [J02 9] Pharyngitis, unspecified etiology       ED Disposition     ED Disposition Condition Comment    Discharge  Dario Castaneda discharge to home/self care      Condition at discharge: Good        Follow-up Information     Follow up With Specialties Details Why Contact Janae Waterman DO Internal Medicine In 3 days  [place]  [phone] [facility]  [phone]            Patient's Medications   Discharge Prescriptions    CLINDAMYCIN (CLEOCIN) 300 MG CAPSULE    Take 1 capsule (300 mg total) by mouth every 6 (six) hours for 7 days       Start Date: 9/27/2018 End Date: 10/4/2018       Order Dose: 300 mg       Quantity: 28 capsule    Refills: 0    METHYLPREDNISOLONE 4 MG TBPK    Use as directed on package       Start Date: 9/27/2018 End Date: --       Order Dose: --       Quantity: 21 tablet    Refills: 0     No discharge procedures on file      ED Provider  Electronically Signed by           Richard Deluca MD  09/27/18 0862

## 2018-09-27 NOTE — PROGRESS NOTES
[facility] [employer] [facility]        NAME: Rosemarie Thomas is a 37 y o  female  : 1975    MRN: [id number]  DATE: 2018  TIME: 10:26 AM    Assessment and Plan   Sore throat [J02 9]  1  Sore throat  POCT rapid strepA    Transfer to other facility       Patient Instructions     Proceed to ER for further workup  Discussed possibility of abscess     Chief Complaint     Chief Complaint   Patient presents with    Cold Like Symptoms     C/O sinus pressure, b/l ear pain,post nasal drip and severe sore throat x 3 days  History of Present Illness       37 y o  Female with sore throat and fever x 3 days  Pt states her mouth is painful  "This is the worst sore throat and mouth pain I have ever experienced " C/o drooling and swollen chin  Pt c/o dry cough  Pt is tolerating liquid however feels it comes back out of her mouth at times  Tolerating Solids  Denies body aches, SOB, wheezing, chest pain, abdominal pain  Review of Systems   Review of Systems   Constitutional: Negative for chills, fatigue and fever  HENT: Positive for drooling, facial swelling and sore throat  Negative for congestion, ear pain, sinus pain and trouble swallowing  Eyes: Negative for pain, discharge and redness  Respiratory: Negative for cough, chest tightness, shortness of breath and wheezing  Cardiovascular: Negative for chest pain, palpitations and leg swelling  Gastrointestinal: Negative for abdominal pain, diarrhea, nausea and vomiting  Musculoskeletal: Negative for arthralgias, joint swelling and myalgias  Skin: Negative for rash  Neurological: Negative for dizziness, weakness, numbness and headaches           Current Medications       Current Outpatient Prescriptions:     aspirin (ECOTRIN LOW STRENGTH) 81 mg EC tablet, Take 81 mg by mouth daily, Disp: , Rfl:     atorvastatin (LIPITOR) 40 mg tablet, TAKE 1 TABLET DAILY AS DIRECTED, Disp: 90 tablet, Rfl: 3    fluticasone (FLONASE) 50 mcg/act nasal spray, 2 sprays into each nostril daily, Disp: , Rfl:     lisinopril-hydrochlorothiazide (PRINZIDE,ZESTORETIC) 20-25 MG per tablet, TAKE 1 TABLET DAILY, Disp: 90 tablet, Rfl: 2    propranolol (INDERAL LA) 160 mg, Take 160 mg by mouth daily  , Disp: , Rfl:     topiramate (TOPAMAX) 50 MG tablet, , Disp: , Rfl:     Current Allergies     Allergies as of 09/27/2018    (No Known Allergies)            The following portions of the patient's history were reviewed and updated as appropriate: allergies, current medications, past family history, past medical history, past social history, past surgical history and problem list      Past Medical History:   Diagnosis Date    Acrochordon     last assessed: 1/22/2015    Allergic     Arthralgia of elbow, left     last assessed: 3/19/2015    Closed fracture of seven ribs     last assessed: 9/5/2013    Depression     Dysfunction of left eustachian tube     last assessed: 10/9/2014    Fibromyalgia     GERD (gastroesophageal reflux disease)     Hyperlipidemia     Hypertension     Nicotine dependence     last assessed: 12/18/2014    Ovarian cyst     last assessed: 8/1/2013    Urinary frequency     last assessed: 8/3/2015       Past Surgical History:   Procedure Laterality Date    CARPAL TUNNEL RELEASE      NECK SURGERY      MS ESOPHAGOGASTRODUODENOSCOPY TRANSORAL DIAGNOSTIC N/A 3/2/2018    Procedure: ESOPHAGOGASTRODUODENOSCOPY (EGD);   Surgeon: Reinier Hernandez MD;  Location: [facility];  Service: Gastroenterology    TONSILLECTOMY      TUBAL LIGATION         Family History   Problem Relation Age of Onset    Diabetes type II Mother         mellitus    No Known Problems Father     Cerebral aneurysm Maternal Grandmother         anterior cerebral artery    Coronary artery disease Maternal Grandfather     Diabetes type II Maternal Grandfather         mellitus    Lung cancer Paternal Grandfather     Other Paternal Grandfather         [employer]al Workers' Pneumoconiosis (complicated)    Cerebral aneurysm Maternal Uncle         anterior cerebral artery         Medications have been verified  Objective   /64   Pulse 72   Temp 98 5 °F (36 9 °C) (Tympanic)   Resp 18   Ht 5' 3" (1 6 m)   Wt 97 5 kg (215 lb)   LMP 09/26/2018   SpO2 97%   BMI 38 09 kg/m²        Physical Exam     Physical Exam   Constitutional: She is oriented to person, place, and time  She appears well-developed and well-nourished  No distress  HENT:   Head: Normocephalic  Right Ear: External ear normal    Left Ear: External ear normal    Mouth/Throat: Uvula is midline  Posterior oropharyngeal erythema present  Eyes: Pupils are equal, round, and reactive to light  Conjunctivae and EOM are normal    Neck: Normal range of motion  Neck supple  Cardiovascular: Normal rate, regular rhythm and normal heart sounds  No murmur heard  Pulmonary/Chest: Effort normal and breath sounds normal  No respiratory distress  She has no wheezes  Abdominal: Soft  Bowel sounds are normal  There is no tenderness  Musculoskeletal: Normal range of motion  Lymphadenopathy:        Head (right side): Submandibular adenopathy present  Head (left side): Submandibular adenopathy present  She has cervical adenopathy  Right cervical: Posterior cervical adenopathy present  Neurological: She is alert and oriented to person, place, and time  She has normal reflexes  Skin: Skin is warm and dry  Psychiatric: She has a normal mood and affect  Nursing note and vitals reviewed

## 2018-10-18 ENCOUNTER — OFFICE VISIT (OUTPATIENT)
Dept: INTERNAL MEDICINE CLINIC | Facility: CLINIC | Age: 43
End: 2018-10-18
Payer: COMMERCIAL

## 2018-10-18 VITALS
TEMPERATURE: 99.2 F | HEIGHT: 63 IN | WEIGHT: 202.6 LBS | DIASTOLIC BLOOD PRESSURE: 70 MMHG | BODY MASS INDEX: 35.9 KG/M2 | RESPIRATION RATE: 16 BRPM | HEART RATE: 71 BPM | OXYGEN SATURATION: 98 % | SYSTOLIC BLOOD PRESSURE: 116 MMHG

## 2018-10-18 DIAGNOSIS — K12.2 LUDWIG'S ANGINA: Primary | ICD-10-CM

## 2018-10-18 PROCEDURE — 99213 OFFICE O/P EST LOW 20 MIN: CPT | Performed by: INTERNAL MEDICINE

## 2018-10-18 RX ORDER — CLINDAMYCIN HYDROCHLORIDE 300 MG/1
300 CAPSULE ORAL 3 TIMES DAILY
Qty: 42 CAPSULE | Refills: 0 | Status: SHIPPED | OUTPATIENT
Start: 2018-10-18 | End: 2018-11-01

## 2018-10-18 RX ORDER — AMOXICILLIN AND CLAVULANATE POTASSIUM 875; 125 MG/1; MG/1
1 TABLET, FILM COATED ORAL EVERY 12 HOURS SCHEDULED
Qty: 28 TABLET | Refills: 0 | Status: SHIPPED | OUTPATIENT
Start: 2018-10-18 | End: 2018-11-01

## 2018-10-18 NOTE — PROGRESS NOTES
Assessment/Plan:       Diagnoses and all orders for this visit:    George's angina  -     clindamycin (CLEOCIN) 300 MG capsule; Take 1 capsule (300 mg total) by mouth 3 (three) times a day for 14 days  -     amoxicillin-clavulanate (AUGMENTIN) 875-125 mg per tablet; Take 1 tablet by mouth every 12 (twelve) hours for 14 days        No problem-specific Assessment & Plan notes found for this encounter  We will follow up in the next several weeks and see how things go  Subjective:      Patient ID: Jessica Bruno is a 37 y o  female  The patient was having pain in the area of her neck and states that this has been getting worse over the past 2 weeks  The patient states that the pain is pressure like and is present most of the day  [facility] and was [facility] to the ER  She was given an antibiotic and discharged  She noted minimal change with the antibiotic  The patient states that there are no other issues at this time  She has no fever or chills and states that she has no nasal congestion or sinus pain  She does have a dry non-productive cough  The following portions of the patient's history were reviewed and updated as appropriate:   She has a past medical history of Acrochordon; Allergic; Arthralgia of elbow, left; Closed fracture of seven ribs; Depression; Dysfunction of left eustachian tube; Fibromyalgia; GERD (gastroesophageal reflux disease); Hyperlipidemia; Hypertension; Nicotine dependence; Ovarian cyst; and Urinary frequency  ,   does not have any pertinent problems on file  ,   has a past surgical history that includes Tubal ligation; Neck surgery; Tonsillectomy; Carpal tunnel release; and pr esophagogastroduodenoscopy transoral diagnostic (N/A, 3/2/2018)  ,  family history includes Cerebral aneurysm in her maternal grandmother and maternal uncle; Coronary artery disease in her maternal grandfather; Diabetes type II in her maternal grandfather and mother; Lung cancer in her paternal grandfather; No Known Problems in her father; Other in her paternal grandfather  ,   reports that she quit smoking about 2 years ago  Her smoking use included Cigarettes  She has never used smokeless tobacco  She reports that she drinks alcohol  She reports that she does not use drugs  ,  has No Known Allergies     Current Outpatient Prescriptions   Medication Sig Dispense Refill    aspirin (ECOTRIN LOW STRENGTH) 81 mg EC tablet Take 81 mg by mouth daily      atorvastatin (LIPITOR) 40 mg tablet TAKE 1 TABLET DAILY AS DIRECTED 90 tablet 3    fluticasone (FLONASE) 50 mcg/act nasal spray 2 sprays into each nostril daily      lisinopril-hydrochlorothiazide (PRINZIDE,ZESTORETIC) 20-25 MG per tablet TAKE 1 TABLET DAILY 90 tablet 2    propranolol (INDERAL LA) 160 mg Take 160 mg by mouth daily        topiramate (TOPAMAX) 50 MG tablet daily        amoxicillin-clavulanate (AUGMENTIN) 875-125 mg per tablet Take 1 tablet by mouth every 12 (twelve) hours for 14 days 28 tablet 0    clindamycin (CLEOCIN) 300 MG capsule Take 1 capsule (300 mg total) by mouth 3 (three) times a day for 14 days 42 capsule 0     No current facility-administered medications for this visit  Review of Systems   Constitutional: Negative for chills, diaphoresis, fatigue and fever  HENT: Positive for sore throat  Respiratory: Negative for cough, chest tightness, shortness of breath and stridor  Cardiovascular: Negative for chest pain, palpitations and leg swelling  Gastrointestinal: Negative for abdominal pain, constipation, diarrhea and nausea  Genitourinary: Negative  Skin: Negative  Neurological: Negative  Hematological: Negative  Psychiatric/Behavioral: Negative            Objective:  Vitals:    10/18/18 1442   BP: 116/70   BP Location: Left arm   Patient Position: Sitting   Cuff Size: Large   Pulse: 71   Resp: 16   Temp: 99 2 °F (37 3 °C)   SpO2: 98%   Weight: 91 9 kg (202 lb 9 6 oz)   Height: 5' 3" (1 6 m) Body mass index is 35 89 kg/m²  Physical Exam   Constitutional: She appears well-developed and well-nourished  HENT:   Right Ear: External ear normal    Left Ear: Tympanic membrane, external ear and ear canal normal    Nose: No rhinorrhea, nasal deformity or septal deviation  Mouth/Throat: Oropharynx is clear and moist and mucous membranes are normal    Neck: Edema present  No thyroid mass and no thyromegaly present  Pulmonary/Chest: No stridor  Nursing note and vitals reviewed

## 2018-11-01 ENCOUNTER — TRANSCRIBE ORDERS (OUTPATIENT)
Dept: LAB | Facility: CLINIC | Age: 43
End: 2018-11-01

## 2018-11-01 ENCOUNTER — APPOINTMENT (OUTPATIENT)
Dept: LAB | Facility: CLINIC | Age: 43
End: 2018-11-01
Payer: COMMERCIAL

## 2018-11-01 ENCOUNTER — OFFICE VISIT (OUTPATIENT)
Dept: INTERNAL MEDICINE CLINIC | Facility: CLINIC | Age: 43
End: 2018-11-01
Payer: COMMERCIAL

## 2018-11-01 VITALS
HEIGHT: 63 IN | RESPIRATION RATE: 16 BRPM | DIASTOLIC BLOOD PRESSURE: 74 MMHG | TEMPERATURE: 97.8 F | BODY MASS INDEX: 36.11 KG/M2 | HEART RATE: 65 BPM | SYSTOLIC BLOOD PRESSURE: 112 MMHG | OXYGEN SATURATION: 98 % | WEIGHT: 203.8 LBS

## 2018-11-01 DIAGNOSIS — K12.2 LUDWIG'S ANGINA: Primary | ICD-10-CM

## 2018-11-01 DIAGNOSIS — K12.2 LUDWIG'S ANGINA: ICD-10-CM

## 2018-11-01 LAB
ANION GAP SERPL CALCULATED.3IONS-SCNC: 7 MMOL/L (ref 4–13)
BUN SERPL-MCNC: 12 MG/DL (ref 5–25)
CALCIUM SERPL-MCNC: 9 MG/DL (ref 8.3–10.1)
CHLORIDE SERPL-SCNC: 106 MMOL/L (ref 100–108)
CO2 SERPL-SCNC: 23 MMOL/L (ref 21–32)
CREAT SERPL-MCNC: 0.86 MG/DL (ref 0.6–1.3)
GFR SERPL CREATININE-BSD FRML MDRD: 82 ML/MIN/1.73SQ M
GLUCOSE P FAST SERPL-MCNC: 94 MG/DL (ref 65–99)
POTASSIUM SERPL-SCNC: 3.9 MMOL/L (ref 3.5–5.3)
SODIUM SERPL-SCNC: 136 MMOL/L (ref 136–145)

## 2018-11-01 PROCEDURE — 99213 OFFICE O/P EST LOW 20 MIN: CPT | Performed by: INTERNAL MEDICINE

## 2018-11-01 PROCEDURE — 36415 COLL VENOUS BLD VENIPUNCTURE: CPT

## 2018-11-01 PROCEDURE — 3008F BODY MASS INDEX DOCD: CPT | Performed by: INTERNAL MEDICINE

## 2018-11-01 PROCEDURE — 80048 BASIC METABOLIC PNL TOTAL CA: CPT

## 2018-11-01 NOTE — PROGRESS NOTES
Assessment/Plan:       Diagnoses and all orders for this visit:    [place] angina  -     CTA neck w wo contrast; Future  -     Basic metabolic panel; Future        No problem-specific Assessment & Plan notes found for this encounter  We will follow up on the Ct of the neck and see if there are any issues noted    Subjective:      Patient ID: Urvashi Marin is a 37 y o  female  The patient has noted marked improvement in the neck pain, but continues to have issues with pain in the area under her jaw line  The patient states that the pain is like an aching and burning  She has noted elevated temps, but no overt fever  The patient states that the pain makes it difficult to swallow  The patient states that there are no other issues at this time  The following portions of the patient's history were reviewed and updated as appropriate:   She has a past medical history of Acrochordon; Allergic; Arthralgia of elbow, left; Closed fracture of seven ribs; Depression; Dysfunction of left eustachian tube; Fibromyalgia; GERD (gastroesophageal reflux disease); Hyperlipidemia; Hypertension; Nicotine dependence; Ovarian cyst; and Urinary frequency  ,   does not have any pertinent problems on file  ,   has a past surgical history that includes Tubal ligation; Neck surgery; Tonsillectomy; Carpal tunnel release; and pr esophagogastroduodenoscopy transoral diagnostic (N/A, 3/2/2018)  ,  family history includes Cerebral aneurysm in her maternal grandmother and maternal uncle; Coronary artery disease in her maternal grandfather; Diabetes type II in her maternal grandfather and mother; Lung cancer in her paternal grandfather; No Known Problems in her father; Other in her paternal grandfather  ,   reports that she quit smoking about 2 years ago  Her smoking use included Cigarettes  She has never used smokeless tobacco  She reports that she drinks alcohol  She reports that she does not use drugs  ,  has No Known Allergies     Current Outpatient Prescriptions   Medication Sig Dispense Refill    aspirin (ECOTRIN LOW STRENGTH) 81 mg EC tablet Take 81 mg by mouth daily      atorvastatin (LIPITOR) 40 mg tablet TAKE 1 TABLET DAILY AS DIRECTED 90 tablet 3    fluticasone (FLONASE) 50 mcg/act nasal spray 2 sprays into each nostril daily      lisinopril-hydrochlorothiazide (PRINZIDE,ZESTORETIC) 20-25 MG per tablet TAKE 1 TABLET DAILY 90 tablet 2    propranolol (INDERAL LA) 160 mg Take 160 mg by mouth daily        topiramate (TOPAMAX) 50 MG tablet daily         No current facility-administered medications for this visit  Review of Systems   Constitutional: Positive for fatigue  Negative for chills and fever  HENT: Positive for trouble swallowing  Negative for drooling, postnasal drip, rhinorrhea, sinus pain, sinus pressure, sore throat and voice change  Respiratory: Negative for cough, choking and shortness of breath  Cardiovascular: Negative for chest pain, palpitations and leg swelling  Gastrointestinal: Negative for abdominal pain, constipation, diarrhea, nausea and vomiting  Genitourinary: Negative  Musculoskeletal: Negative  Skin: Negative  Neurological: Negative  Psychiatric/Behavioral: Negative  Objective:  Vitals:    11/01/18 1131   BP: 112/74   BP Location: Left arm   Patient Position: Sitting   Cuff Size: Large   Pulse: 65   Resp: 16   Temp: 97 8 °F (36 6 °C)   SpO2: 98%   Weight: 92 4 kg (203 lb 12 8 oz)   Height: 5' 3" (1 6 m)     Body mass index is 36 1 kg/m²  Physical Exam   Constitutional: She is oriented to person, place, and time  She appears well-developed and well-nourished  HENT:   Head: Normocephalic and atraumatic  Mouth/Throat: No oropharyngeal exudate, posterior oropharyngeal edema, posterior oropharyngeal erythema or tonsillar abscesses  Eyes: Pupils are equal, round, and reactive to light   Conjunctivae and EOM are normal    Neck:       Cardiovascular: Normal rate, regular rhythm, normal heart sounds and intact distal pulses  Exam reveals no gallop  No murmur heard  Pulmonary/Chest: Effort normal and breath sounds normal  No stridor  No respiratory distress  She has no wheezes  She has no rales  Abdominal: Soft  Bowel sounds are normal  She exhibits no distension  There is no tenderness  There is no rebound  Musculoskeletal: Normal range of motion  She exhibits no edema or tenderness  Neurological: She is alert and oriented to person, place, and time  No cranial nerve deficit  Coordination normal    Skin: Skin is warm and dry  No rash noted  No erythema  Psychiatric: She has a normal mood and affect  Nursing note and vitals reviewed

## 2018-11-07 ENCOUNTER — TELEPHONE (OUTPATIENT)
Dept: INTERNAL MEDICINE CLINIC | Facility: CLINIC | Age: 43
End: 2018-11-07

## 2018-11-07 NOTE — TELEPHONE ENCOUNTER
The insurance denied the request for the CTA of the neck-please advise me on what you bradley like to do next      thanks

## 2018-11-08 DIAGNOSIS — R22.1 NECK MASS: Primary | ICD-10-CM

## 2018-11-09 ENCOUNTER — HOSPITAL ENCOUNTER (OUTPATIENT)
Dept: ULTRASOUND IMAGING | Facility: HOSPITAL | Age: 43
Discharge: HOME | End: 2018-11-09
Payer: COMMERCIAL

## 2018-11-09 DIAGNOSIS — R22.1 NECK MASS: ICD-10-CM

## 2018-11-09 PROCEDURE — 76536 US EXAM OF HEAD AND NECK: CPT

## 2018-11-13 ENCOUNTER — OFFICE VISIT (OUTPATIENT)
Dept: FAMILY MEDICINE CLINIC | Facility: CLINIC | Age: 43
End: 2018-11-13
Payer: COMMERCIAL

## 2018-11-13 VITALS
TEMPERATURE: 98.3 F | HEIGHT: 63 IN | OXYGEN SATURATION: 99 % | HEART RATE: 81 BPM | SYSTOLIC BLOOD PRESSURE: 116 MMHG | BODY MASS INDEX: 35.61 KG/M2 | WEIGHT: 201 LBS | DIASTOLIC BLOOD PRESSURE: 60 MMHG

## 2018-11-13 DIAGNOSIS — Z13.1 SCREENING FOR DIABETES MELLITUS: ICD-10-CM

## 2018-11-13 DIAGNOSIS — J30.9 ALLERGIC RHINITIS, UNSPECIFIED SEASONALITY, UNSPECIFIED TRIGGER: ICD-10-CM

## 2018-11-13 DIAGNOSIS — Z23 NEED FOR INFLUENZA VACCINATION: ICD-10-CM

## 2018-11-13 DIAGNOSIS — E78.5 HYPERLIPIDEMIA, UNSPECIFIED HYPERLIPIDEMIA TYPE: ICD-10-CM

## 2018-11-13 DIAGNOSIS — R22.1 NECK MASS: Primary | ICD-10-CM

## 2018-11-13 DIAGNOSIS — Z13.0 SCREENING FOR DEFICIENCY ANEMIA: ICD-10-CM

## 2018-11-13 DIAGNOSIS — Z12.39 SCREENING FOR BREAST CANCER: ICD-10-CM

## 2018-11-13 PROCEDURE — 90686 IIV4 VACC NO PRSV 0.5 ML IM: CPT

## 2018-11-13 PROCEDURE — 90471 IMMUNIZATION ADMIN: CPT

## 2018-11-13 PROCEDURE — 99214 OFFICE O/P EST MOD 30 MIN: CPT | Performed by: INTERNAL MEDICINE

## 2018-11-13 PROCEDURE — 1036F TOBACCO NON-USER: CPT | Performed by: INTERNAL MEDICINE

## 2018-11-13 RX ORDER — FLUTICASONE PROPIONATE 50 MCG
2 SPRAY, SUSPENSION (ML) NASAL DAILY
Qty: 16 G | Refills: 3 | Status: SHIPPED | OUTPATIENT
Start: 2018-11-13

## 2018-11-13 NOTE — PROGRESS NOTES
Assessment/Plan:       Diagnoses and all orders for this visit:    Neck mass  -     CT soft tissue neck w contrast; Future    Screening for deficiency anemia  -     CBC and differential; Future    Screening for diabetes mellitus  -     Comprehensive metabolic panel; Future    Hyperlipidemia, unspecified hyperlipidemia type  -     Lipid panel; Future    Screening for breast cancer  -     Mammo screening bilateral w 3d & cad; Future    Allergic rhinitis, unspecified seasonality, unspecified trigger  -     fluticasone (FLONASE) 50 mcg/act nasal spray; 2 sprays into each nostril daily    Need for influenza vaccination  -     SYRINGE/SINGLE-DOSE VIAL: influenza vaccine, 9730-6940, quadrivalent, 0 5 mL, preservative-free, for patients 3+ yr (FLUZONE)        No problem-specific Assessment & Plan notes found for this encounter  We will follow up on the soft tissues of the neck    Subjective:      Patient ID: Dario Castaneda is a 37 y o  female  The patient has no fever or chills, but notes continued pain in the area of the anterior neck under the mandible  The patient states that there has been only mild improvement with the antibiotics  The patient states that it is painful to swallow  The patient notes no other issues at this time  Her BP is doing good on the Propranolol and Prinzide  The following portions of the patient's history were reviewed and updated as appropriate:   She has a past medical history of Acrochordon; Allergic; Arthralgia of elbow, left; Closed fracture of seven ribs; Depression; Dysfunction of left eustachian tube; Fibromyalgia; GERD (gastroesophageal reflux disease); Hyperlipidemia; Hypertension; Nicotine dependence; Ovarian cyst; and Urinary frequency  ,   does not have any pertinent problems on file  ,   has a past surgical history that includes Tubal ligation; Neck surgery; Tonsillectomy;  Carpal tunnel release; and pr esophagogastroduodenoscopy transoral diagnostic (N/A, 3/2/2018)  ,  family history includes Cerebral aneurysm in her maternal grandmother and maternal uncle; Coronary artery disease in her maternal grandfather; Diabetes type II in her maternal grandfather and mother; Lung cancer in her paternal grandfather; No Known Problems in her father; Other in her paternal grandfather  ,   reports that she quit smoking about 2 years ago  Her smoking use included Cigarettes  She has never used smokeless tobacco  She reports that she drinks alcohol  She reports that she does not use drugs  ,  has No Known Allergies     Current Outpatient Prescriptions   Medication Sig Dispense Refill    aspirin (ECOTRIN LOW STRENGTH) 81 mg EC tablet Take 81 mg by mouth daily      atorvastatin (LIPITOR) 40 mg tablet TAKE 1 TABLET DAILY AS DIRECTED 90 tablet 3    fluticasone (FLONASE) 50 mcg/act nasal spray 2 sprays into each nostril daily 16 g 3    lisinopril-hydrochlorothiazide (PRINZIDE,ZESTORETIC) 20-25 MG per tablet TAKE 1 TABLET DAILY 90 tablet 2    propranolol (INDERAL LA) 160 mg Take 160 mg by mouth daily        topiramate (TOPAMAX) 50 MG tablet Take 50 mg by mouth daily         No current facility-administered medications for this visit  Review of Systems   Constitutional: Negative for chills, fatigue and fever  HENT: Negative for ear pain, sinus pain, sinus pressure, sneezing and sore throat  Respiratory: Negative for cough, chest tightness and shortness of breath  Cardiovascular: Negative for chest pain, palpitations and leg swelling  Gastrointestinal: Negative for abdominal pain, constipation, diarrhea, nausea and vomiting  Genitourinary: Negative  Musculoskeletal: Negative for arthralgias, joint swelling and myalgias  Neurological: Negative  Psychiatric/Behavioral: Negative            Objective:  Vitals:    11/13/18 1506   BP: 116/60   BP Location: Left arm   Patient Position: Sitting   Cuff Size: Standard   Pulse: 81   Temp: 98 3 °F (36 8 °C)   TempSrc: Tympanic   SpO2: 99%   Weight: 91 2 kg (201 lb)   Height: 5' 3" (1 6 m)     Body mass index is 35 61 kg/m²  Physical Exam   Constitutional: She is oriented to person, place, and time  She appears well-developed and well-nourished  HENT:   Head: Normocephalic and atraumatic  Eyes: Pupils are equal, round, and reactive to light  Conjunctivae and EOM are normal    Neck: Normal range of motion  Neck supple  No thyroid mass present  Cardiovascular: Normal rate, regular rhythm, normal heart sounds and intact distal pulses  Exam reveals no gallop  No murmur heard  Pulmonary/Chest: Effort normal and breath sounds normal  No respiratory distress  She has no wheezes  She has no rales  Abdominal: Soft  Bowel sounds are normal  She exhibits no distension  There is no tenderness  There is no rebound  Musculoskeletal: She exhibits no edema or tenderness  Neurological: She is alert and oriented to person, place, and time  No cranial nerve deficit  Coordination normal    Skin: Skin is warm and dry  No erythema  Psychiatric: She has a normal mood and affect  Nursing note and vitals reviewed

## 2018-12-06 ENCOUNTER — APPOINTMENT (OUTPATIENT)
Dept: LAB | Facility: HOSPITAL | Age: 43
End: 2018-12-06
Payer: COMMERCIAL

## 2018-12-06 ENCOUNTER — HOSPITAL ENCOUNTER (OUTPATIENT)
Dept: CT IMAGING | Facility: HOSPITAL | Age: 43
Discharge: HOME | End: 2018-12-06
Payer: COMMERCIAL

## 2018-12-06 DIAGNOSIS — R22.1 NECK MASS: ICD-10-CM

## 2018-12-06 DIAGNOSIS — Z13.0 SCREENING FOR DEFICIENCY ANEMIA: ICD-10-CM

## 2018-12-06 DIAGNOSIS — Z13.1 SCREENING FOR DIABETES MELLITUS: ICD-10-CM

## 2018-12-06 DIAGNOSIS — E78.5 HYPERLIPIDEMIA, UNSPECIFIED HYPERLIPIDEMIA TYPE: ICD-10-CM

## 2018-12-06 LAB
ALBUMIN SERPL BCP-MCNC: 4.3 G/DL (ref 3.5–5.7)
ALP SERPL-CCNC: 55 U/L (ref 40–150)
ALT SERPL W P-5'-P-CCNC: 15 U/L (ref 7–52)
ANION GAP SERPL CALCULATED.3IONS-SCNC: 6 MMOL/L (ref 4–13)
AST SERPL W P-5'-P-CCNC: 11 U/L (ref 13–39)
BASOPHILS # BLD AUTO: 0 THOUSANDS/ΜL (ref 0–0.1)
BASOPHILS NFR BLD AUTO: 1 % (ref 0–2)
BILIRUB SERPL-MCNC: 0.8 MG/DL (ref 0.2–1)
BUN SERPL-MCNC: 13 MG/DL (ref 7–25)
CALCIUM SERPL-MCNC: 9 MG/DL (ref 8.6–10.5)
CHLORIDE SERPL-SCNC: 107 MMOL/L (ref 98–107)
CHOLEST SERPL-MCNC: 146 MG/DL (ref 0–200)
CO2 SERPL-SCNC: 25 MMOL/L (ref 21–31)
CREAT SERPL-MCNC: 0.86 MG/DL (ref 0.6–1.2)
EOSINOPHIL # BLD AUTO: 0.1 THOUSAND/ΜL (ref 0–0.61)
EOSINOPHIL NFR BLD AUTO: 2 % (ref 0–5)
ERYTHROCYTE [DISTWIDTH] IN BLOOD BY AUTOMATED COUNT: 13.7 % (ref 11.5–14.5)
GFR SERPL CREATININE-BSD FRML MDRD: 82 ML/MIN/1.73SQ M
GLUCOSE P FAST SERPL-MCNC: 121 MG/DL (ref 65–99)
HCT VFR BLD AUTO: 40.2 % (ref 34.8–46.1)
HDLC SERPL-MCNC: 39 MG/DL (ref 40–60)
HGB BLD-MCNC: 13 G/DL (ref 12–16)
LDLC SERPL CALC-MCNC: 86 MG/DL (ref 75–193)
LYMPHOCYTES # BLD AUTO: 1.5 THOUSANDS/ΜL (ref 0.6–4.47)
LYMPHOCYTES NFR BLD AUTO: 34 % (ref 21–51)
MCH RBC QN AUTO: 29.6 PG (ref 26–34)
MCHC RBC AUTO-ENTMCNC: 32.3 G/DL (ref 31–37)
MCV RBC AUTO: 92 FL (ref 81–99)
MONOCYTES # BLD AUTO: 0.3 THOUSAND/ΜL (ref 0.17–1.22)
MONOCYTES NFR BLD AUTO: 7 % (ref 2–12)
NEUTROPHILS # BLD AUTO: 2.4 THOUSANDS/ΜL (ref 1.4–6.5)
NEUTS SEG NFR BLD AUTO: 56 % (ref 42–75)
NONHDLC SERPL-MCNC: 107 MG/DL
NRBC BLD AUTO-RTO: 0 /100 WBCS
PLATELET # BLD AUTO: 207 THOUSANDS/UL (ref 149–390)
PMV BLD AUTO: 9 FL (ref 8.6–11.7)
POTASSIUM SERPL-SCNC: 4 MMOL/L (ref 3.5–5.5)
PROT SERPL-MCNC: 6.7 G/DL (ref 6.4–8.9)
RBC # BLD AUTO: 4.39 MILLION/UL (ref 3.9–5.2)
SODIUM SERPL-SCNC: 138 MMOL/L (ref 134–143)
TRIGL SERPL-MCNC: 106 MG/DL (ref 44–166)
WBC # BLD AUTO: 4.3 THOUSAND/UL (ref 4.8–10.8)

## 2018-12-06 PROCEDURE — 36415 COLL VENOUS BLD VENIPUNCTURE: CPT

## 2018-12-06 PROCEDURE — 70491 CT SOFT TISSUE NECK W/DYE: CPT

## 2018-12-06 PROCEDURE — 85025 COMPLETE CBC W/AUTO DIFF WBC: CPT

## 2018-12-06 PROCEDURE — 80061 LIPID PANEL: CPT

## 2018-12-06 PROCEDURE — 80053 COMPREHEN METABOLIC PANEL: CPT

## 2018-12-06 RX ADMIN — IOHEXOL 80 ML: 350 INJECTION, SOLUTION INTRAVENOUS at 08:42

## 2018-12-13 ENCOUNTER — HOSPITAL ENCOUNTER (OUTPATIENT)
Dept: MAMMOGRAPHY | Facility: HOSPITAL | Age: 43
Discharge: HOME | End: 2018-12-13
Payer: COMMERCIAL

## 2018-12-13 DIAGNOSIS — Z12.39 SCREENING FOR BREAST CANCER: ICD-10-CM

## 2018-12-13 PROCEDURE — 77063 BREAST TOMOSYNTHESIS BI: CPT

## 2018-12-13 PROCEDURE — 77067 SCR MAMMO BI INCL CAD: CPT

## 2018-12-14 DIAGNOSIS — R92.8 ABNORMAL MAMMOGRAM: Primary | ICD-10-CM

## 2018-12-17 DIAGNOSIS — R92.1 BREAST CALCIFICATION, LEFT: Primary | ICD-10-CM

## 2018-12-20 ENCOUNTER — HOSPITAL ENCOUNTER (OUTPATIENT)
Dept: MAMMOGRAPHY | Facility: HOSPITAL | Age: 43
Discharge: HOME | End: 2018-12-20
Payer: COMMERCIAL

## 2018-12-20 VITALS — BODY MASS INDEX: 35.61 KG/M2 | WEIGHT: 201 LBS | HEIGHT: 63 IN

## 2018-12-20 DIAGNOSIS — R92.8 ABNORMAL MAMMOGRAM: ICD-10-CM

## 2018-12-20 PROCEDURE — 77065 DX MAMMO INCL CAD UNI: CPT

## 2019-01-24 ENCOUNTER — OFFICE VISIT (OUTPATIENT)
Dept: FAMILY MEDICINE CLINIC | Facility: CLINIC | Age: 44
End: 2019-01-24
Payer: COMMERCIAL

## 2019-01-24 VITALS
TEMPERATURE: 98.7 F | SYSTOLIC BLOOD PRESSURE: 108 MMHG | BODY MASS INDEX: 36.04 KG/M2 | DIASTOLIC BLOOD PRESSURE: 74 MMHG | HEART RATE: 72 BPM | HEIGHT: 63 IN | RESPIRATION RATE: 16 BRPM | WEIGHT: 203.4 LBS

## 2019-01-24 DIAGNOSIS — M54.42 ACUTE BILATERAL LOW BACK PAIN WITH LEFT-SIDED SCIATICA: Primary | ICD-10-CM

## 2019-01-24 PROCEDURE — 99213 OFFICE O/P EST LOW 20 MIN: CPT | Performed by: INTERNAL MEDICINE

## 2019-01-24 PROCEDURE — 96372 THER/PROPH/DIAG INJ SC/IM: CPT | Performed by: INTERNAL MEDICINE

## 2019-01-24 RX ORDER — CYCLOBENZAPRINE HCL 10 MG
10 TABLET ORAL 3 TIMES DAILY PRN
Qty: 30 TABLET | Refills: 0 | Status: SHIPPED | OUTPATIENT
Start: 2019-01-24 | End: 2019-03-08

## 2019-01-24 RX ORDER — NAPROXEN 500 MG/1
250 TABLET ORAL 2 TIMES DAILY WITH MEALS
COMMUNITY
End: 2019-03-08

## 2019-01-24 RX ORDER — DEXAMETHASONE SODIUM PHOSPHATE 4 MG/ML
4 INJECTION, SOLUTION INTRA-ARTICULAR; INTRALESIONAL; INTRAMUSCULAR; INTRAVENOUS; SOFT TISSUE ONCE
Status: COMPLETED | OUTPATIENT
Start: 2019-01-24 | End: 2019-01-24

## 2019-01-24 RX ORDER — PREDNISONE 10 MG/1
TABLET ORAL
Qty: 50 TABLET | Refills: 0 | Status: SHIPPED | OUTPATIENT
Start: 2019-01-24 | End: 2019-03-08

## 2019-01-24 RX ORDER — TRAMADOL HYDROCHLORIDE 50 MG/1
50 TABLET ORAL EVERY 6 HOURS PRN
Qty: 30 TABLET | Refills: 0 | Status: SHIPPED | OUTPATIENT
Start: 2019-01-24 | End: 2019-03-08

## 2019-01-24 RX ADMIN — DEXAMETHASONE SODIUM PHOSPHATE 4 MG: 4 INJECTION, SOLUTION INTRA-ARTICULAR; INTRALESIONAL; INTRAMUSCULAR; INTRAVENOUS; SOFT TISSUE at 10:40

## 2019-01-24 NOTE — PROGRESS NOTES
Assessment/Plan:    No problem-specific Assessment & Plan notes found for this encounter  Diagnoses and all orders for this visit:    Acute bilateral low back pain with left-sided sciatica  -     cyclobenzaprine (FLEXERIL) 10 mg tablet; Take 1 tablet (10 mg total) by mouth 3 (three) times a day as needed for muscle spasms  -     predniSONE 10 mg tablet; 40mg po q d x 5, then 30mg po q d x 5, then 20mg po q d x 5, then 10mg po q d x 5, then d/c   -     traMADol (ULTRAM) 50 mg tablet; Take 1 tablet (50 mg total) by mouth every 6 (six) hours as needed for moderate pain  -     dexamethasone (DECADRON) injection 4 mg; Inject 1 mL (4 mg total) into a muscle once     Other orders  -     naproxen (NAPROSYN) 500 mg tablet; Take 250 mg by mouth 2 (two) times a day with meals      A/P: Rest and warm compresses  Will d/c NSAID's and start steroid wean and muscle relaxant  Short term ultram  May need an xray or PT  RTC one week for f/u  Subjective:      Patient ID: Dario Castaneda is a 37 y o  female  WF presents with her  for acute onset LBP with radiation down the left  Buttock  Awoke four days ago with the pain  No prior back issues  No injuries  No change in bowel or bladder habits  Pain is a 10/10  Taking OTC NSAID's w/o relief  Difficulty walking  Back Pain   Associated symptoms include weakness  Pertinent negatives include no abdominal pain, chest pain, dysuria, fever or headaches  The following portions of the patient's history were reviewed and updated as appropriate:   She  has a past medical history of Acrochordon; Allergic; Arthralgia of elbow, left; Closed fracture of seven ribs; Depression; Dysfunction of left eustachian tube; Fibromyalgia; GERD (gastroesophageal reflux disease); Hyperlipidemia; Hypertension; Nicotine dependence; Ovarian cyst; and Urinary frequency    She   Patient Active Problem List    Diagnosis Date Noted    Neck mass 11/08/2018    Primary insomnia 04/03/2018    LPRD (laryngopharyngeal reflux disease) 03/30/2018    Chronic GERD 02/13/2018    Dysphagia 02/13/2018    Esophageal dysphagia 02/13/2018    Gastroesophageal reflux disease without esophagitis 02/13/2018    Dyspepsia 11/17/2017    Mild depression ([facility] ) 10/31/2017    Fibromyalgia 09/26/2017    Tremor 09/26/2017    Abnormal TSH 12/15/2016    Vitamin D deficiency 11/11/2016    Allergic rhinitis 10/09/2014    Hypercholesterolemia 09/05/2013    Bipolar I disorder, most recent episode (or current) manic ([facility] ) 08/01/2013    Hypertension 08/01/2013    Subacute dyskinesia due to drug 08/01/2013     She  has a past surgical history that includes Tubal ligation; Neck surgery; Tonsillectomy; Carpal tunnel release; and pr esophagogastroduodenoscopy transoral diagnostic (N/A, 3/2/2018)  Her family history includes Cerebral aneurysm in her maternal grandmother and maternal uncle; Coronary artery disease in her maternal grandfather; Diabetes type II in her maternal grandfather and mother; Lung cancer in her paternal grandfather; No Known Problems in her father; Other in her paternal grandfather  She  reports that she quit smoking about 3 years ago  Her smoking use included Cigarettes  She has never used smokeless tobacco  She reports that she drinks alcohol  She reports that she does not use drugs    Current Outpatient Prescriptions   Medication Sig Dispense Refill    aspirin (ECOTRIN LOW STRENGTH) 81 mg EC tablet Take 81 mg by mouth daily      atorvastatin (LIPITOR) 40 mg tablet TAKE 1 TABLET DAILY AS DIRECTED 90 tablet 3    fluticasone (FLONASE) 50 mcg/act nasal spray 2 sprays into each nostril daily 16 g 3    lisinopril-hydrochlorothiazide (PRINZIDE,ZESTORETIC) 20-25 MG per tablet TAKE 1 TABLET DAILY 90 tablet 2    naproxen (NAPROSYN) 500 mg tablet Take 250 mg by mouth 2 (two) times a day with meals      propranolol (INDERAL LA) 160 mg Take 160 mg by mouth daily        topiramate (TOPAMAX) 50 MG tablet Take 50 mg by mouth daily        cyclobenzaprine (FLEXERIL) 10 mg tablet Take 1 tablet (10 mg total) by mouth 3 (three) times a day as needed for muscle spasms 30 tablet 0    predniSONE 10 mg tablet 40mg po q d x 5, then 30mg po q d x 5, then 20mg po q d x 5, then 10mg po q d x 5, then d/c  50 tablet 0    traMADol (ULTRAM) 50 mg tablet Take 1 tablet (50 mg total) by mouth every 6 (six) hours as needed for moderate pain 30 tablet 0     No current facility-administered medications for this visit  Current Outpatient Prescriptions on File Prior to Visit   Medication Sig    aspirin (ECOTRIN LOW STRENGTH) 81 mg EC tablet Take 81 mg by mouth daily    atorvastatin (LIPITOR) 40 mg tablet TAKE 1 TABLET DAILY AS DIRECTED    fluticasone (FLONASE) 50 mcg/act nasal spray 2 sprays into each nostril daily    lisinopril-hydrochlorothiazide (PRINZIDE,ZESTORETIC) 20-25 MG per tablet TAKE 1 TABLET DAILY    propranolol (INDERAL LA) 160 mg Take 160 mg by mouth daily      topiramate (TOPAMAX) 50 MG tablet Take 50 mg by mouth daily       No current facility-administered medications on file prior to visit  She has No Known Allergies       Review of Systems   Constitutional: Positive for activity change  Negative for chills, diaphoresis, fatigue and fever  Respiratory: Negative for cough, chest tightness, shortness of breath and wheezing  Cardiovascular: Negative for chest pain, palpitations and leg swelling  Gastrointestinal: Negative for abdominal pain, constipation, diarrhea, nausea and vomiting  Genitourinary: Negative for difficulty urinating, dysuria and frequency  Musculoskeletal: Positive for back pain and gait problem  Negative for arthralgias, joint swelling and myalgias  Neurological: Positive for weakness  Negative for light-headedness and headaches  Psychiatric/Behavioral: Negative for confusion  The patient is not nervous/anxious            Objective:      /74 (BP Location: Left arm, Patient Position: Sitting, Cuff Size: Large)   Pulse 72   Temp 98 7 °F (37 1 °C) (Tympanic)   Resp 16   Ht 5' 3" (1 6 m)   Wt 92 3 kg (203 lb 6 4 oz)   BMI 36 03 kg/m²          Physical Exam   Constitutional: She is oriented to person, place, and time  She appears well-developed and well-nourished  She appears distressed  HENT:   Head: Normocephalic and atraumatic  Mouth/Throat: Oropharynx is clear and moist    Eyes: Pupils are equal, round, and reactive to light  Conjunctivae and EOM are normal    Musculoskeletal: She exhibits tenderness  She exhibits no deformity  LS spine w/o gross deformity, increase temp, erythema, or swelling  Tenderness over bilat L2-S1  ROM decreased all planes by 60%  Spasms noted  LE strength 5/5 with tone/rom wnl  DTR 2/4 with distraction  Unable to walk on toes or heels due to pain  Neurological: She is alert and oriented to person, place, and time  Psychiatric: She has a normal mood and affect  Her behavior is normal  Judgment and thought content normal    Nursing note and vitals reviewed

## 2019-02-22 DIAGNOSIS — R56.9 SEIZURES (HCC): Primary | ICD-10-CM

## 2019-02-22 DIAGNOSIS — R25.1 TREMOR: ICD-10-CM

## 2019-03-08 ENCOUNTER — OFFICE VISIT (OUTPATIENT)
Dept: INTERNAL MEDICINE CLINIC | Facility: CLINIC | Age: 44
End: 2019-03-08
Payer: COMMERCIAL

## 2019-03-08 VITALS
HEIGHT: 63 IN | BODY MASS INDEX: 34.91 KG/M2 | SYSTOLIC BLOOD PRESSURE: 130 MMHG | HEART RATE: 84 BPM | TEMPERATURE: 99.1 F | DIASTOLIC BLOOD PRESSURE: 84 MMHG | RESPIRATION RATE: 18 BRPM | WEIGHT: 197 LBS

## 2019-03-08 DIAGNOSIS — M54.2 NECK PAIN: ICD-10-CM

## 2019-03-08 DIAGNOSIS — F33.1 MODERATE EPISODE OF RECURRENT MAJOR DEPRESSIVE DISORDER (HCC): Primary | ICD-10-CM

## 2019-03-08 PROCEDURE — 3008F BODY MASS INDEX DOCD: CPT | Performed by: INTERNAL MEDICINE

## 2019-03-08 PROCEDURE — 99213 OFFICE O/P EST LOW 20 MIN: CPT | Performed by: INTERNAL MEDICINE

## 2019-03-08 PROCEDURE — 1036F TOBACCO NON-USER: CPT | Performed by: INTERNAL MEDICINE

## 2019-03-08 RX ORDER — SERTRALINE HYDROCHLORIDE 25 MG/1
25 TABLET, FILM COATED ORAL DAILY
Qty: 30 TABLET | Refills: 5 | Status: SHIPPED | OUTPATIENT
Start: 2019-03-08

## 2019-03-08 NOTE — PROGRESS NOTES
Assessment/Plan:       Diagnoses and all orders for this visit:    Moderate episode of recurrent major depressive disorder (HCC)  -     sertraline (ZOLOFT) 25 mg tablet; Take 1 tablet (25 mg total) by mouth daily    Neck pain  -     Ambulatory Referral to Otolaryngology; Future        No problem-specific Assessment & Plan notes found for this encounter  We will follow up in the next several weeks and we will see how it goes  Subjective:      Patient ID: Jessica Bruno is a 37 y o  female  The patient was seen and examined and was noted to have issues with depression and anxiety  The patient noted that she has issues with dealing with work and notes that she does not want to go to work at this time  She is tearful and notes irritability and excessive worry  The patient notes no suicidal ideation at this time  The patient states that if she were to feel like hurting herself she would report to the ER  The patient still has pain at the lateral aspect of the neck  The patient states that this has been persistent for several months now  The CT of the neck noted no mass in the area  The pain localizes to the anterior lateral neck at the level of the larynx  The following portions of the patient's history were reviewed and updated as appropriate:   She has a past medical history of Acrochordon, Allergic, Arthralgia of elbow, left, Closed fracture of seven ribs, Depression, Dysfunction of left eustachian tube, Fibromyalgia, GERD (gastroesophageal reflux disease), Hyperlipidemia, Hypertension, Nicotine dependence, Ovarian cyst, and Urinary frequency  ,  does not have any pertinent problems on file  ,   has a past surgical history that includes Tubal ligation; Neck surgery; Tonsillectomy; Carpal tunnel release; and pr esophagogastroduodenoscopy transoral diagnostic (N/A, 3/2/2018)  ,  family history includes Cerebral aneurysm in her maternal grandmother and maternal uncle; Coronary artery disease in her maternal grandfather; Diabetes type II in her maternal grandfather and mother; Lung cancer in her paternal grandfather; No Known Problems in her father; Other in her paternal grandfather  ,   reports that she quit smoking about 3 years ago  Her smoking use included cigarettes  She has never used smokeless tobacco  She reports that she drinks alcohol  She reports that she does not use drugs  ,  has No Known Allergies     Current Outpatient Medications   Medication Sig Dispense Refill    aspirin (ECOTRIN LOW STRENGTH) 81 mg EC tablet Take 81 mg by mouth daily      atorvastatin (LIPITOR) 40 mg tablet TAKE 1 TABLET DAILY AS DIRECTED 90 tablet 3    fluticasone (FLONASE) 50 mcg/act nasal spray 2 sprays into each nostril daily 16 g 3    lisinopril-hydrochlorothiazide (PRINZIDE,ZESTORETIC) 20-25 MG per tablet TAKE 1 TABLET DAILY 90 tablet 2    propranolol (INDERAL LA) 160 mg Take 160 mg by mouth daily        topiramate (TOPAMAX) 50 MG tablet Take 50 mg by mouth daily        sertraline (ZOLOFT) 25 mg tablet Take 1 tablet (25 mg total) by mouth daily 30 tablet 5     No current facility-administered medications for this visit  Review of Systems   Constitutional: Negative for chills, fatigue and fever  HENT: Negative for ear pain, postnasal drip, rhinorrhea, sinus pressure, sinus pain and sneezing  Respiratory: Negative for cough, choking, chest tightness and shortness of breath  Cardiovascular: Negative for chest pain, palpitations and leg swelling  Gastrointestinal: Negative for abdominal pain, blood in stool and constipation  Musculoskeletal: Negative for arthralgias and myalgias  Neurological: Negative for dizziness, seizures, speech difficulty and numbness  Psychiatric/Behavioral: Positive for dysphoric mood  The patient is nervous/anxious            Objective:  Vitals:    03/08/19 1111   BP: 130/84   BP Location: Left arm   Patient Position: Sitting   Cuff Size: Large   Pulse: 84   Resp: 18 Temp: 99 1 °F (37 3 °C)   Weight: 89 4 kg (197 lb)   Height: 5' 3" (1 6 m)     Body mass index is 34 9 kg/m²  Physical Exam   Constitutional: She is oriented to person, place, and time  She appears well-developed and well-nourished  HENT:   Head: Atraumatic  Eyes: Pupils are equal, round, and reactive to light  Conjunctivae and EOM are normal    Neck: Normal range of motion  Neck supple  Cardiovascular: Normal rate, regular rhythm, normal heart sounds and intact distal pulses  Pulmonary/Chest: Effort normal and breath sounds normal  No stridor  No respiratory distress  She has no wheezes  She has no rales  Abdominal: Soft  Bowel sounds are normal  She exhibits no distension  There is no tenderness  There is no guarding  Musculoskeletal: Normal range of motion  Neurological: She is alert and oriented to person, place, and time  Skin: Skin is warm and dry  Psychiatric: Her speech is normal and behavior is normal  Judgment and thought content normal  Cognition and memory are normal  She exhibits a depressed mood  Nursing note and vitals reviewed

## 2019-03-21 PROBLEM — J31.2 CHRONIC PHARYNGITIS: Status: ACTIVE | Noted: 2019-03-21

## 2019-06-17 ENCOUNTER — APPOINTMENT (OUTPATIENT)
Dept: LAB | Facility: CLINIC | Age: 44
End: 2019-06-17
Payer: COMMERCIAL

## 2019-06-17 ENCOUNTER — TRANSCRIBE ORDERS (OUTPATIENT)
Dept: LAB | Facility: CLINIC | Age: 44
End: 2019-06-17

## 2019-06-17 DIAGNOSIS — F48.9 DEFERRED DIAGNOSIS ON AXIS I: Primary | ICD-10-CM

## 2019-06-17 LAB
ALBUMIN SERPL BCP-MCNC: 4 G/DL (ref 3.5–5)
ALP SERPL-CCNC: 63 U/L (ref 46–116)
ALT SERPL W P-5'-P-CCNC: 25 U/L (ref 12–78)
ANION GAP SERPL CALCULATED.3IONS-SCNC: 5 MMOL/L (ref 4–13)
AST SERPL W P-5'-P-CCNC: 12 U/L (ref 5–45)
BILIRUB SERPL-MCNC: 1.04 MG/DL (ref 0.2–1)
BUN SERPL-MCNC: 13 MG/DL (ref 5–25)
CALCIUM SERPL-MCNC: 9.2 MG/DL (ref 8.3–10.1)
CHLORIDE SERPL-SCNC: 110 MMOL/L (ref 100–108)
CO2 SERPL-SCNC: 24 MMOL/L (ref 21–32)
CREAT SERPL-MCNC: 0.75 MG/DL (ref 0.6–1.3)
GFR SERPL CREATININE-BSD FRML MDRD: 96 ML/MIN/1.73SQ M
GLUCOSE P FAST SERPL-MCNC: 107 MG/DL (ref 65–99)
POTASSIUM SERPL-SCNC: 4.2 MMOL/L (ref 3.5–5.3)
PROT SERPL-MCNC: 7.3 G/DL (ref 6.4–8.2)
SODIUM SERPL-SCNC: 139 MMOL/L (ref 136–145)

## 2019-06-17 PROCEDURE — 80053 COMPREHEN METABOLIC PANEL: CPT

## 2019-06-17 PROCEDURE — 36415 COLL VENOUS BLD VENIPUNCTURE: CPT

## 2019-06-18 DIAGNOSIS — I10 BENIGN ESSENTIAL HTN: ICD-10-CM

## 2019-06-18 RX ORDER — LISINOPRIL AND HYDROCHLOROTHIAZIDE 25; 20 MG/1; MG/1
TABLET ORAL
Qty: 90 TABLET | Refills: 2 | Status: SHIPPED | OUTPATIENT
Start: 2019-06-18

## 2019-08-26 DIAGNOSIS — E78.00 HYPERCHOLESTEROLEMIA: ICD-10-CM

## 2019-08-26 RX ORDER — ATORVASTATIN CALCIUM 40 MG/1
TABLET, FILM COATED ORAL
Qty: 90 TABLET | Refills: 4 | Status: SHIPPED | OUTPATIENT
Start: 2019-08-26

## 2022-08-17 NOTE — PROGRESS NOTES
Assessment/Plan:      Diagnoses and all orders for this visit:    Acute recurrent sinusitis, unspecified location  -     cefprozil (CEFZIL) 250 mg tablet; Take 1 tablet (250 mg total) by mouth 2 (two) times a day for 10 days If Cefzil is not covered by insurance try Ceftin 250 mg BID x 10 days  -     predniSONE 20 mg tablet; Three tabs daily x3 days, 2 tabs daily x2 days, 1 tab daily x2 days  Other orders  -     atorvastatin (LIPITOR) 40 mg tablet; Take 1 tablet by mouth daily  -     fluticasone (FLONASE) 50 mcg/act nasal spray; 2 sprays into each nostril daily  -     lidocaine (XYLOCAINE) 5 % ointment;   -     lisinopril-hydrochlorothiazide (PRINZIDE,ZESTORETIC) 20-25 MG per tablet;   -     pantoprazole (PROTONIX) 40 mg tablet; Take 1 tablet by mouth 2 (two) times a day  -     propranolol (INDERAL LA) 160 mg; Take by mouth  -     sertraline (ZOLOFT) 25 mg tablet; Take 1 tablet by mouth daily  -     aspirin (ECOTRIN LOW STRENGTH) 81 mg EC tablet; Take 81 mg by mouth daily        Patient Instructions   Rhinosinusitis   WHAT YOU NEED TO KNOW:   Rhinosinusitis (RS) is inflammation of your nose and sinuses  It commonly begins as a virus, often as a common cold  Viruses usually last 7 to 10 days and do not need treatment  When the virus does not get better on its own, you may have bacterial RS  This means that bacteria have begun to grow inside your sinuses  Acute RS lasts less than 4 weeks  Chronic RS lasts 12 weeks or more  Recurrent RS is when you have 4 or more episodes of RS in one year  DISCHARGE INSTRUCTIONS:   Return to the emergency department if:   · Your eye and eyelid are red, swollen, and painful  · You cannot open your eye  · You have double vision or you cannot see  · Your eyeball bulges out or you cannot move your eye  · You are more sleepy than normal or you notice changes in your ability to think, move, or talk  · You have a stiff neck, a fever, or a bad headache       · You have swelling of your forehead or scalp  Contact your healthcare provider if:   · Your symptoms are worse or do not improve after 3 to 5 days of treatment  · You have questions or concerns about your condition or care  Medicines: You may need any of the following:  · Acetaminophen  decreases pain and fever  It is available without a doctor's order  Ask how much to take and how often to take it  Follow directions  Acetaminophen can cause liver damage if not taken correctly  · NSAIDs , such as ibuprofen, help decrease swelling, pain, and fever  This medicine is available with or without a doctor's order  NSAIDs can cause stomach bleeding or kidney problems in certain people  If you take blood thinner medicine, always ask your healthcare provider if NSAIDs are safe for you  Always read the medicine label and follow directions  · Nasal steroid sprays  decrease inflammation in your nose and sinuses  · Decongestants  reduce swelling and drain mucus in the nose and sinuses  They may help you breathe easier  · Antihistamines  dry mucus in the nose and relieve sneezing  · Antibiotics  treat a bacterial infection and may be needed if your symptoms do not improve or they get worse  · Take your medicine as directed  Contact your healthcare provider if you think your medicine is not helping or if you have side effects  Tell him or her if you are allergic to any medicine  Keep a list of the medicines, vitamins, and herbs you take  Include the amounts, and when and why you take them  Bring the list or the pill bottles to follow-up visits  Carry your medicine list with you in case of an emergency  Self-care:   · Rinse your sinuses  Use a sinus rinse device to rinse your nasal passages with a saline (salt water) solution  This will help thin the mucus in your nose and rinse away pollen and dirt  It will also help reduce swelling so you can breathe normally   Ask your healthcare provider how often to do this      · Breathe in steam   Heat a bowl of water until you see steam  Lean over the bowl and make a tent over your head with a large towel  Breathe deeply for about 20 minutes  Be careful not to get too close to the steam or burn yourself  Do this 3 times a day  You can also breathe deeply when you take a hot shower  · Sleep with your head elevated  Place an extra pillow under your head before you go to sleep to help your sinuses drain  · Drink liquids as directed  Ask your healthcare provider how much liquid to drink each day and which liquids are best for you  Liquids will thin the mucus in your nose and help it drain  Avoid drinks that contain alcohol or caffeine  · Do not smoke, and avoid secondhand smoke  Nicotine and other chemicals in cigarettes and cigars can make your symptoms worse  Ask your healthcare provider for information if you currently smoke and need help to quit  E-cigarettes or smokeless tobacco still contain nicotine  Talk to your healthcare provider before you use these products  Follow up with your healthcare provider as directed: Follow up if your symptoms are worse or not better after 3 to 5 days of treatment  Write down your questions so you remember to ask them during your visits  © 2017 [employer]  Information is for End User's use only and may not be sold, redistributed or otherwise used for commercial purposes  All illustrations and images included in CareNotes® are the copyrighted property of "[employer]  or [employer]  The above information is an  only  It is not intended as medical advice for individual conditions or treatments  Talk to your doctor, nurse or pharmacist before following any medical regimen to see if it is safe and effective for you  Subjective:     Patient ID: Cash Dumont is a 38 y o  female      Patient presents with a 5 day history of sinus pressure pain purulent nasal drainage she denies any fever chills sore throat cough chest pain shortness of breath abdominal pain nausea or vomiting  She recently was treated with Augmentin which helped somewhat but never fully took away the infection  Review of Systems   Constitutional: Negative for chills and fever  HENT: Positive for sinus pain and sinus pressure  Negative for congestion, ear pain, postnasal drip, rhinorrhea, sore throat and trouble swallowing  Eyes: Negative for discharge  Respiratory: Negative for cough, chest tightness, shortness of breath and wheezing  Cardiovascular: Negative for chest pain  Gastrointestinal: Negative for abdominal pain, diarrhea, nausea and vomiting  Musculoskeletal: Negative for myalgias  Skin: Negative for rash  Neurological: Positive for headaches  Negative for light-headedness  Hematological: Negative for adenopathy  Objective:     Physical Exam   Constitutional: She is oriented to person, place, and time  She appears well-developed and well-nourished  HENT:   Head: Normocephalic and atraumatic  Right Ear: External ear normal    Left Ear: External ear normal    Nose: Nose normal    Mouth/Throat: Oropharynx is clear and moist    Eyes: Conjunctivae are normal    Neck: Neck supple  Cardiovascular: Normal rate, regular rhythm and normal heart sounds  Pulmonary/Chest: Effort normal and breath sounds normal    Musculoskeletal: Normal range of motion  Lymphadenopathy:     She has no cervical adenopathy  Neurological: She is alert and oriented to person, place, and time  Skin: Skin is warm and dry  No rash noted  Psychiatric: She has a normal mood and affect  Nursing note and vitals reviewed  negative

## (undated) DEVICE — LUBRICANT SURGILUBE TUBE 4 OZ  FLIP TOP

## (undated) DEVICE — TUBING SUCTION 5MM X 12 FT

## (undated) DEVICE — 2000CC GUARDIAN II: Brand: GUARDIAN

## (undated) DEVICE — FORCEPS BIOPSY ALLIGATOR JAW W/NEEDLE 2.8MM

## (undated) DEVICE — CONMED SCOPE SAVER BITE BLOCK, 20X27 MM: Brand: SCOPE SAVER